# Patient Record
Sex: MALE | Race: BLACK OR AFRICAN AMERICAN | NOT HISPANIC OR LATINO | Employment: OTHER | URBAN - NONMETROPOLITAN AREA
[De-identification: names, ages, dates, MRNs, and addresses within clinical notes are randomized per-mention and may not be internally consistent; named-entity substitution may affect disease eponyms.]

---

## 2018-07-08 ENCOUNTER — HOSPITAL ENCOUNTER (EMERGENCY)
Facility: HOSPITAL | Age: 23
Discharge: HOME | End: 2018-07-09
Attending: EMERGENCY MEDICINE | Admitting: EMERGENCY MEDICINE

## 2018-07-08 ENCOUNTER — APPOINTMENT (OUTPATIENT)
Dept: GENERAL RADIOLOGY | Facility: HOSPITAL | Age: 23
End: 2018-07-08

## 2018-07-08 DIAGNOSIS — R07.9 CHEST PAIN, UNSPECIFIED TYPE: Primary | ICD-10-CM

## 2018-07-08 LAB
ANION GAP SERPL CALCULATED.3IONS-SCNC: 14 MMOL/L (ref 4–13)
APTT PPP: 36 SECONDS (ref 24.1–34.8)
BASOPHILS # BLD AUTO: 0.02 10*3/MM3 (ref 0–0.2)
BASOPHILS NFR BLD AUTO: 0.2 % (ref 0–2)
BUN BLD-MCNC: 10 MG/DL (ref 5–21)
BUN/CREAT SERPL: 14.9 (ref 7–25)
CALCIUM SPEC-SCNC: 9.4 MG/DL (ref 8.4–10.4)
CHLORIDE SERPL-SCNC: 101 MMOL/L (ref 98–110)
CK MB SERPL-CCNC: 0.93 NG/ML (ref 0–5)
CO2 SERPL-SCNC: 27 MMOL/L (ref 24–31)
CREAT BLD-MCNC: 0.67 MG/DL (ref 0.5–1.4)
DEPRECATED RDW RBC AUTO: 42.3 FL (ref 40–54)
EOSINOPHIL # BLD AUTO: 0 10*3/MM3 (ref 0–0.7)
EOSINOPHIL NFR BLD AUTO: 0 % (ref 0–4)
ERYTHROCYTE [DISTWIDTH] IN BLOOD BY AUTOMATED COUNT: 14.2 % (ref 12–15)
GFR SERPL CREATININE-BSD FRML MDRD: >150 ML/MIN/1.73
GLUCOSE BLD-MCNC: 146 MG/DL (ref 70–100)
HCT VFR BLD AUTO: 40.9 % (ref 40–52)
HGB BLD-MCNC: 13.4 G/DL (ref 14–18)
INR PPP: 1 (ref 0.91–1.09)
LYMPHOCYTES # BLD AUTO: 2.39 10*3/MM3 (ref 0.72–4.86)
LYMPHOCYTES NFR BLD AUTO: 24.2 % (ref 15–45)
MCH RBC QN AUTO: 27 PG (ref 28–32)
MCHC RBC AUTO-ENTMCNC: 32.8 G/DL (ref 33–36)
MCV RBC AUTO: 82.5 FL (ref 82–95)
MONOCYTES # BLD AUTO: 0.46 10*3/MM3 (ref 0.19–1.3)
MONOCYTES NFR BLD AUTO: 4.7 % (ref 4–12)
NEUTROPHILS # BLD AUTO: 6.97 10*3/MM3 (ref 1.87–8.4)
NEUTROPHILS NFR BLD AUTO: 70.4 % (ref 39–78)
NT-PROBNP SERPL-MCNC: 87 PG/ML (ref 0–450)
PLATELET # BLD AUTO: 186 10*3/MM3 (ref 130–400)
PMV BLD AUTO: 12.7 FL (ref 6–12)
POTASSIUM BLD-SCNC: 3.6 MMOL/L (ref 3.5–5.3)
PROTHROMBIN TIME: 13.5 SECONDS (ref 11.9–14.6)
RBC # BLD AUTO: 4.96 10*6/MM3 (ref 4.8–5.9)
SODIUM BLD-SCNC: 142 MMOL/L (ref 135–145)
TROPONIN I SERPL-MCNC: <0.012 NG/ML (ref 0–0.03)
WBC NRBC COR # BLD: 9.89 10*3/MM3 (ref 4.8–10.8)

## 2018-07-08 PROCEDURE — 25010000002 MORPHINE PER 10 MG: Performed by: EMERGENCY MEDICINE

## 2018-07-08 PROCEDURE — 82553 CREATINE MB FRACTION: CPT | Performed by: EMERGENCY MEDICINE

## 2018-07-08 PROCEDURE — 80307 DRUG TEST PRSMV CHEM ANLYZR: CPT | Performed by: EMERGENCY MEDICINE

## 2018-07-08 PROCEDURE — 99284 EMERGENCY DEPT VISIT MOD MDM: CPT

## 2018-07-08 PROCEDURE — 85025 COMPLETE CBC W/AUTO DIFF WBC: CPT | Performed by: EMERGENCY MEDICINE

## 2018-07-08 PROCEDURE — 96375 TX/PRO/DX INJ NEW DRUG ADDON: CPT

## 2018-07-08 PROCEDURE — 85610 PROTHROMBIN TIME: CPT | Performed by: EMERGENCY MEDICINE

## 2018-07-08 PROCEDURE — 80048 BASIC METABOLIC PNL TOTAL CA: CPT | Performed by: EMERGENCY MEDICINE

## 2018-07-08 PROCEDURE — 93010 ELECTROCARDIOGRAM REPORT: CPT | Performed by: INTERNAL MEDICINE

## 2018-07-08 PROCEDURE — 25010000002 ONDANSETRON PER 1 MG: Performed by: EMERGENCY MEDICINE

## 2018-07-08 PROCEDURE — 71046 X-RAY EXAM CHEST 2 VIEWS: CPT

## 2018-07-08 PROCEDURE — 93005 ELECTROCARDIOGRAM TRACING: CPT | Performed by: EMERGENCY MEDICINE

## 2018-07-08 PROCEDURE — 96374 THER/PROPH/DIAG INJ IV PUSH: CPT

## 2018-07-08 PROCEDURE — 84484 ASSAY OF TROPONIN QUANT: CPT | Performed by: EMERGENCY MEDICINE

## 2018-07-08 PROCEDURE — 85730 THROMBOPLASTIN TIME PARTIAL: CPT | Performed by: EMERGENCY MEDICINE

## 2018-07-08 PROCEDURE — 83880 ASSAY OF NATRIURETIC PEPTIDE: CPT | Performed by: EMERGENCY MEDICINE

## 2018-07-08 RX ORDER — MORPHINE SULFATE 10 MG/ML
8 INJECTION INTRAMUSCULAR; INTRAVENOUS; SUBCUTANEOUS ONCE
Status: COMPLETED | OUTPATIENT
Start: 2018-07-08 | End: 2018-07-08

## 2018-07-08 RX ORDER — ALBUTEROL SULFATE 90 UG/1
2 AEROSOL, METERED RESPIRATORY (INHALATION) EVERY 4 HOURS PRN
Status: ON HOLD | COMMUNITY
End: 2018-09-21

## 2018-07-08 RX ORDER — ALBUTEROL SULFATE 1.25 MG/3ML
1 SOLUTION RESPIRATORY (INHALATION) EVERY 4 HOURS PRN
COMMUNITY
End: 2018-09-21 | Stop reason: HOSPADM

## 2018-07-08 RX ORDER — ONDANSETRON 2 MG/ML
4 INJECTION INTRAMUSCULAR; INTRAVENOUS ONCE
Status: COMPLETED | OUTPATIENT
Start: 2018-07-08 | End: 2018-07-08

## 2018-07-08 RX ADMIN — MORPHINE SULFATE 8 MG: 10 INJECTION INTRAVENOUS at 21:44

## 2018-07-08 RX ADMIN — ONDANSETRON 4 MG: 2 INJECTION INTRAMUSCULAR; INTRAVENOUS at 21:44

## 2018-07-09 VITALS
SYSTOLIC BLOOD PRESSURE: 157 MMHG | TEMPERATURE: 98.7 F | HEART RATE: 74 BPM | WEIGHT: 315 LBS | BODY MASS INDEX: 46.65 KG/M2 | DIASTOLIC BLOOD PRESSURE: 74 MMHG | RESPIRATION RATE: 17 BRPM | HEIGHT: 69 IN | OXYGEN SATURATION: 98 %

## 2018-07-09 LAB
AMPHET+METHAMPHET UR QL: NEGATIVE
BARBITURATES UR QL SCN: NEGATIVE
BENZODIAZ UR QL SCN: NEGATIVE
CANNABINOIDS SERPL QL: NEGATIVE
COCAINE UR QL: NEGATIVE
METHADONE UR QL SCN: NEGATIVE
OPIATES UR QL: POSITIVE
PCP UR QL SCN: NEGATIVE
TROPONIN I SERPL-MCNC: <0.012 NG/ML (ref 0–0.03)

## 2018-07-09 PROCEDURE — 93005 ELECTROCARDIOGRAM TRACING: CPT | Performed by: EMERGENCY MEDICINE

## 2018-07-09 PROCEDURE — 93010 ELECTROCARDIOGRAM REPORT: CPT | Performed by: INTERNAL MEDICINE

## 2018-07-09 PROCEDURE — 84484 ASSAY OF TROPONIN QUANT: CPT | Performed by: EMERGENCY MEDICINE

## 2018-07-09 RX ORDER — KETOROLAC TROMETHAMINE 10 MG/1
10 TABLET, FILM COATED ORAL EVERY 6 HOURS PRN
Qty: 15 TABLET | Refills: 0 | Status: ON HOLD | OUTPATIENT
Start: 2018-07-09 | End: 2018-09-21

## 2018-07-09 NOTE — ED PROVIDER NOTES
Subjective   24 y/o male with history of DM, HTN, HLD with history of pneumothorax in the past with RAD as well arrives for evaluation of 3 days of left sided sharp/stabbing pain without radiation but made worse with deep breathing that feels similar to his prior pneumothorax for which he denies, falls, trauma, fevers, chills, nausea, vomiting, diarrhea, palpitations, numbness, tingling, loss of sensation, hemoptysis, history of PE or DVT, recent surgeries or cancer treatments, LE pain or swelling, abdominal pain, coughing or any other issues. He arrives in NAD.         Family, social and past history reviewed as below, prior documentation of H and Ps and other documentation are reviewed:    Past Medical History:  No date: Asthma  No date: Atelectasis  No date: Cardiomegaly  No date: Depression  No date: Diabetes mellitus (CMS/[facility])  No date: Hyperlipidemia  No date: Hypertension  No date: Neuropathy  No date: PTSD (post-traumatic stress disorder)  No date: Stroke (CMS/[facility])      Comment: tia    Past Surgical History:  No date: HERNIA REPAIR    Social History    Marital status:                     Spouse name:                       Years of education:                 Number of children:               Occupational History    None on file    Social History Main Topics    Smoking status: Current Some Day Smoker                                                      Packs/day: 0.00      Years: 0.00           Smokeless tobacco: Not on file                       Alcohol use: Yes                Comment: on occassion    Drug use: Yes                Types: Marijuana       Comment: on occassion    Sexual activity: Not on file          Other Topics            Concern    None on file    Social History Narrative    None on file        Family history: reviewed and father had MI at 21            Review of Systems   All other systems reviewed and are negative.      Past Medical History:   Diagnosis Date   • Asthma    • Atelectasis    •  Cardiomegaly    • Depression    • Diabetes mellitus (CMS/HCC)    • Hyperlipidemia    • Hypertension    • Neuropathy    • PTSD (post-traumatic stress disorder)    • Stroke (CMS/HCC)     tia       Allergies   Allergen Reactions   • Penicillins Anaphylaxis   • Keflex [Cephalexin] Hives       Past Surgical History:   Procedure Laterality Date   • HERNIA REPAIR         Family History   Problem Relation Age of Onset   • Heart failure Father    • Heart failure Maternal Grandmother        Social History     Social History   • Marital status: Single     Social History Main Topics   • Smoking status: Current Some Day Smoker   • Alcohol use Yes      Comment: on occassion   • Drug use: Yes     Types: Marijuana      Comment: on occassion     Other Topics Concern   • Not on file           Objective   Physical Exam   Constitutional: He is oriented to person, place, and time. He appears well-developed and well-nourished.   HENT:   Head: Normocephalic.   Nose: Nose normal.   Eyes: Conjunctivae and EOM are normal. Pupils are equal, round, and reactive to light.   Neck: Normal range of motion.   Cardiovascular: Normal rate, regular rhythm, normal heart sounds and intact distal pulses.    Pulmonary/Chest: Effort normal and breath sounds normal. No respiratory distress. He has no wheezes. He has no rales. He exhibits tenderness.   Abdominal: Soft. Bowel sounds are normal. He exhibits no distension. There is no tenderness.   Musculoskeletal: Normal range of motion. He exhibits no edema.   Neurological: He is alert and oriented to person, place, and time. No cranial nerve deficit. Coordination normal.   Skin: Skin is warm. Capillary refill takes less than 2 seconds. No rash noted. No erythema.   Psychiatric: He has a normal mood and affect.   Vitals reviewed.      ECG 12 Lead    Date/Time: 7/8/2018 8:54 PM  Performed by: ELAYNE ROB  Authorized by: ELAYNE ROB   Interpreted by physician  Previous ECG: no previous ECG  available  Rhythm: sinus rhythm  Rate: normal  BPM: 96  QRS axis: normal  Comments:     Non specific st t changes                 ED Course      XR Chest 2 View   Final Result   1. There is no acute cardiopulmonary process.   2. There is no obvious pneumothorax           This report was finalized on 07/08/2018 22:02 by Dr. Eric Helm MD.        Labs Reviewed   BASIC METABOLIC PANEL - Abnormal; Notable for the following:        Result Value    Glucose 146 (*)     Anion Gap 14.0 (*)     All other components within normal limits    Narrative:     GFR Normal >60  Chronic Kidney Disease <60  Kidney Failure <15   APTT - Abnormal; Notable for the following:     PTT 36.0 (*)     All other components within normal limits   URINE DRUG SCREEN - Abnormal; Notable for the following:     Opiate Screen Positive (*)     All other components within normal limits    Narrative:     Negative Thresholds For Drugs Screened in Urine:    Amphetamines          500 ng/ml  Barbiturates          200 ng/ml  Benzodiazepines       200 ng/ml  Cocaine               150 ng/ml  Methadone             150 ng/ml  Opiates               300 ng/ml  Phencyclidine         25 ng/ml  THC                      50 ng/ml    The normal value for all drugs tested is negative. This report includes final unconfirmed screening results.  A positive result by this assay can be, at your request, sent to the Reference Lab for confirmation by gas chromatography. Unconfirmed results must not be used for non-medical purposes, such as employment or legal testing. Clinical consideration should be applied to any drug of abuse test result, particularly when unconfirmed results are used.   CBC WITH AUTO DIFFERENTIAL - Abnormal; Notable for the following:     Hemoglobin 13.4 (*)     MCH 27.0 (*)     MCHC 32.8 (*)     MPV 12.7 (*)     All other components within normal limits   PROTIME-INR - Normal   BNP (IN-HOUSE) - Normal   CK MB - Normal    Narrative:     CKMB Index  not indicated   TROPONIN (IN-HOUSE) - Normal   TROPONIN (IN-HOUSE) - Normal   CBC AND DIFFERENTIAL    Narrative:     The following orders were created for panel order CBC & Differential.  Procedure                               Abnormality         Status                     ---------                               -----------         ------                     Manual Differential[[id number]]                                                         CBC Auto Differential[[id number]]        Abnormal            Final result                 Please view results for these tests on the individual orders.     Cp free, two sets of CE and ekg unrevealing, perc score is 0 no need for dimer or further work up for PE. At this time given family history will schedule for stress and encourage follow up and return precautions.           HEART Score (for prediction of 6-week risk of major adverse cardiac event) reviewed and/or performed as part of the patient evaluation and treatment planning process.  The result associated with this review/performance is: 2       MDM      Final diagnoses:   Chest pain, unspecified type            Jose Han MD  07/09/18 0101

## 2018-07-09 NOTE — DISCHARGE INSTRUCTIONS
"      Pio,     While your labs and EKG today did not reveal any signs of heart attack I am scheduling you for a stress test. They should call you in the next 24-48 hours. Please return for worsening pain, fevers, chills, changes in location of pain or any other issues.     Hypertension, commonly called high blood pressure, is when the force of blood pumping through the arteries is too strong. The arteries are the blood vessels that carry blood from the heart throughout the body. Hypertension forces the heart to work harder to pump blood and may cause arteries to become narrow or stiff. Having untreated or uncontrolled hypertension can cause heart attacks, strokes, kidney disease, and other problems.  A blood pressure reading consists of a higher number over a lower number. Ideally, your blood pressure should be below 120/80. The first (\"top\") number is called the systolic pressure. It is a measure of the pressure in your arteries as your heart beats. The second (\"bottom\") number is called the diastolic pressure. It is a measure of the pressure in your arteries as the heart relaxes.  What are the causes?  The cause of this condition is not known.  What increases the risk?  Some risk factors for high blood pressure are under your control. Others are not.  Factors you can change  · Smoking.  · Having type 2 diabetes mellitus, high cholesterol, or both.  · Not getting enough exercise or physical activity.  · Being overweight.  · Having too much fat, sugar, calories, or salt (sodium) in your diet.  · Drinking too much alcohol.  Factors that are difficult or impossible to change  · Having chronic kidney disease.  · Having a family history of high blood pressure.  · Age. Risk increases with age.  · Race. You may be at higher risk if you are -American.  · Gender. Men are at higher risk than women before age 45. After age 65, women are at higher risk than men.  · Having obstructive sleep apnea.  · Stress.  What are " the signs or symptoms?  Extremely high blood pressure (hypertensive crisis) may cause:  · Headache.  · Anxiety.  · Shortness of breath.  · Nosebleed.  · Nausea and vomiting.  · Severe chest pain.  · Jerky movements you cannot control (seizures).    How is this diagnosed?  This condition is diagnosed by measuring your blood pressure while you are seated, with your arm resting on a surface. The cuff of the blood pressure monitor will be placed directly against the skin of your upper arm at the level of your heart. It should be measured at least twice using the same arm. Certain conditions can cause a difference in blood pressure between your right and left arms.  Certain factors can cause blood pressure readings to be lower or higher than normal (elevated) for a short period of time:  · When your blood pressure is higher when you are in a health care provider's office than when you are at home, this is called white coat hypertension. Most people with this condition do not need medicines.  · When your blood pressure is higher at home than when you are in a health care provider's office, this is called masked hypertension. Most people with this condition may need medicines to control blood pressure.    If you have a high blood pressure reading during one visit or you have normal blood pressure with other risk factors:  · You may be asked to return on a different day to have your blood pressure checked again.  · You may be asked to monitor your blood pressure at home for 1 week or longer.    If you are diagnosed with hypertension, you may have other blood or imaging tests to help your health care provider understand your overall risk for other conditions.  How is this treated?  This condition is treated by making healthy lifestyle changes, such as eating healthy foods, exercising more, and reducing your alcohol intake. Your health care provider may prescribe medicine if lifestyle changes are not enough to get your blood  pressure under control, and if:  · Your systolic blood pressure is above 130.  · Your diastolic blood pressure is above 80.    Your personal target blood pressure may vary depending on your medical conditions, your age, and other factors.  Follow these instructions at home:  Eating and drinking  · Eat a diet that is high in fiber and potassium, and low in sodium, added sugar, and fat. An example eating plan is called the DASH (Dietary Approaches to Stop Hypertension) diet. To eat this way:  ? Eat plenty of fresh fruits and vegetables. Try to fill half of your plate at each meal with fruits and vegetables.  ? Eat whole grains, such as whole wheat pasta, brown rice, or whole grain bread. Fill about one quarter of your plate with whole grains.  ? Eat or drink low-fat dairy products, such as skim milk or low-fat yogurt.  ? Avoid fatty cuts of meat, processed or cured meats, and poultry with skin. Fill about one quarter of your plate with lean proteins, such as fish, chicken without skin, beans, eggs, and tofu.  ? Avoid premade and processed foods. These tend to be higher in sodium, added sugar, and fat.  · Reduce your daily sodium intake. Most people with hypertension should eat less than 1,500 mg of sodium a day.  · Limit alcohol intake to no more than 1 drink a day for nonpregnant women and 2 drinks a day for men. One drink equals 12 oz of beer, 5 oz of wine, or 1½ oz of hard liquor.  Lifestyle  · Work with your health care provider to maintain a healthy body weight or to lose weight. Ask what an ideal weight is for you.  · Get at least 30 minutes of exercise that causes your heart to beat faster (aerobic exercise) most days of the week. Activities may include walking, swimming, or biking.  · Include exercise to strengthen your muscles (resistance exercise), such as pilates or lifting weights, as part of your weekly exercise routine. Try to do these types of exercises for 30 minutes at least 3 days a week.  · Do not  use any products that contain nicotine or tobacco, such as cigarettes and e-cigarettes. If you need help quitting, ask your health care provider.  · Monitor your blood pressure at home as told by your health care provider.  · Keep all follow-up visits as told by your health care provider. This is important.  Medicines  · Take over-the-counter and prescription medicines only as told by your health care provider. Follow directions carefully. Blood pressure medicines must be taken as prescribed.  · Do not skip doses of blood pressure medicine. Doing this puts you at risk for problems and can make the medicine less effective.  · Ask your health care provider about side effects or reactions to medicines that you should watch for.  Contact a health care provider if:  · You think you are having a reaction to a medicine you are taking.  · You have headaches that keep coming back (recurring).  · You feel dizzy.  · You have swelling in your ankles.  · You have trouble with your vision.  Get help right away if:  · You develop a severe headache or confusion.  · You have unusual weakness or numbness.  · You feel faint.  · You have severe pain in your chest or abdomen.  · You vomit repeatedly.  · You have trouble breathing.  Summary  · Hypertension is when the force of blood pumping through your arteries is too strong. If this condition is not controlled, it may put you at risk for serious complications.  · Your personal target blood pressure may vary depending on your medical conditions, your age, and other factors. For most people, a normal blood pressure is less than 120/80.  · Hypertension is treated with lifestyle changes, medicines, or a combination of both. Lifestyle changes include weight loss, eating a healthy, low-sodium diet, exercising more, and limiting alcohol.  This information is not intended to replace advice given to you by your health care provider. Make sure you discuss any questions you have with your health  care provider.  Document Released: 12/18/2006 Document Revised: 11/15/2017 Document Reviewed: 11/15/2017  Elsevier Interactive Patient Education © 2018 [employer]

## 2018-07-10 ENCOUNTER — HOSPITAL ENCOUNTER (EMERGENCY)
Facility: HOSPITAL | Age: 23
Discharge: HOME | End: 2018-07-10
Admitting: EMERGENCY MEDICINE

## 2018-07-10 VITALS
DIASTOLIC BLOOD PRESSURE: 68 MMHG | RESPIRATION RATE: 16 BRPM | HEART RATE: 80 BPM | TEMPERATURE: 98.8 F | HEIGHT: 69 IN | BODY MASS INDEX: 46.65 KG/M2 | SYSTOLIC BLOOD PRESSURE: 131 MMHG | OXYGEN SATURATION: 99 % | WEIGHT: 315 LBS

## 2018-07-10 DIAGNOSIS — H10.9 CONJUNCTIVITIS OF BOTH EYES, UNSPECIFIED CONJUNCTIVITIS TYPE: Primary | ICD-10-CM

## 2018-07-10 PROCEDURE — 99283 EMERGENCY DEPT VISIT LOW MDM: CPT

## 2018-07-10 RX ORDER — ERYTHROMYCIN 5 MG/G
OINTMENT OPHTHALMIC
Qty: 3.5 G | Refills: 0 | Status: ON HOLD | OUTPATIENT
Start: 2018-07-10 | End: 2018-09-21

## 2018-07-10 RX ORDER — KETOTIFEN FUMARATE 0.35 MG/ML
1 SOLUTION/ DROPS OPHTHALMIC 2 TIMES DAILY
Qty: 5 ML | Refills: 0 | Status: ON HOLD | OUTPATIENT
Start: 2018-07-10 | End: 2018-09-21

## 2018-07-10 RX ADMIN — FLUORESCEIN SODIUM AND PROPARACAINE HYDROCHLORIDE 1 DROP: 2.5; 5 SOLUTION/ DROPS OPHTHALMIC at 22:20

## 2018-07-11 NOTE — ED NOTES
Left eye 20/80  Right eye 20/50    Pt states letters are blurry when looking at chart      Carter Little RN  07/10/18 [id number]

## 2018-07-11 NOTE — ED PROVIDER NOTES
Subjective     Eye Problem   Associated symptoms: photophobia and redness    Associated symptoms: no discharge and no itching      Patient is a 23-year-old male with chief complaint of bilateral eye irritation.  He describes this has been present the past week.  He complains of irritation in both of his eyes.  He assumed it was due to his allergies.  He has been rubbing them extensively.  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 He thinks his eye issues have worsened at work. While at work today, he felt as if something was in his left eye.  He continued to rub it.  Soon thereafter, he had similar sensation to his right eye. He tried washing it out. He asked his boss to look at it to see if has any foreign body in his eyes. He denies any foreign bodies that he could.  With the irritation continuing in both eyes, he came to ED further evaluated.    The patient is complaining of watery drainage.  He is feeling a burning sensation.  He denies any loss of vision.  He denies  wearing contacts.  He denies any exposure to UV lights or anything excessive beyond the LED lights at work.  He denies welding.  He denies any trauma.    Review of Systems   Constitutional: Negative.    HENT: Negative.    Eyes: Positive for photophobia and redness. Negative for pain, discharge, itching and visual disturbance.   Respiratory: Negative.    Cardiovascular: Negative.    Genitourinary: Negative.    Skin: Negative.    Neurological: Negative.    Psychiatric/Behavioral: Negative.    All other systems reviewed and are negative.      Past Medical History:   Diagnosis Date   • Asthma    • Atelectasis    • Cardiomegaly    • Depression    • Diabetes mellitus (CMS/[facility])    • Hyperlipidemia    • Hypertension    • Neuropathy    • PTSD (post-traumatic stress disorder)    • Stroke (CMS/[facility])     tia       Allergies   Allergen Reactions   • Penicillins Anaphylaxis   • Keflex [Cephalexin] Hives       Past Surgical  "History:   Procedure Laterality Date   • HERNIA REPAIR         Family History   Problem Relation Age of Onset   • Heart failure Father    • Heart failure Maternal Grandmother        Social History     Social History   • Marital status: Single     Social History Main Topics   • Smoking status: Current Some Day Smoker   • Alcohol use Yes      Comment: on occassion   • Drug use: Yes     Types: Marijuana      Comment: on occassion     Other Topics Concern   • Not on file       Prior to Admission medications    Medication Sig Start Date End Date Taking? Authorizing Provider   albuterol (ACCUNEB) 1.25 MG/3ML nebulizer solution Take 1 ampule by nebulization Every 6 (Six) Hours As Needed for Wheezing.    Historical Provider, MD   albuterol (PROVENTIL HFA;VENTOLIN HFA) 108 (90 Base) MCG/ACT inhaler Inhale 2 puffs Every 4 (Four) Hours As Needed for Wheezing.    Historical Provider, MD   insulin detemir (LEVEMIR) 100 UNIT/ML injection Inject 20 Units under the skin Daily.    Historical Provider, MD   ketorolac (TORADOL) 10 MG tablet Take 1 tablet by mouth Every 6 (Six) Hours As Needed for Moderate Pain . 7/9/18   Jose Han MD   Pregabalin (LYRICA PO) Take  by mouth.    Historical Provider, MD       Medications   Fluorescein-Proparacaine 0.25-0.5 % solution 1 drop (not administered)       /71   Pulse 80   Temp 98 °F (36.7 °C)   Resp 18   Ht 175.3 cm (69\")   Wt (!) 145 kg (320 lb)   SpO2 98%   BMI 47.26 kg/m²       Objective   Physical Exam   Constitutional: He is oriented to person, place, and time. He appears well-developed and well-nourished.  Non-toxic appearance. No distress.   HENT:   Head: Normocephalic and atraumatic.   Right Ear: External ear normal.   Left Ear: External ear normal.   Nose: Nose normal.   Mouth/Throat: Uvula is midline, oropharynx is clear and moist and mucous membranes are normal.   Eyes: Conjunctivae, EOM and lids are normal. Pupils are equal, round, and reactive to light. Lids " are everted and swept, no foreign bodies found. Right eye exhibits discharge and exudate. Right eye exhibits no chemosis and no hordeolum. Left eye exhibits discharge and exudate. Left eye exhibits no chemosis and no hordeolum. Right conjunctiva is not injected. Left conjunctiva is not injected. Right eye exhibits normal extraocular motion and no nystagmus. Left eye exhibits normal extraocular motion and no nystagmus.   Slit lamp exam:       The right eye shows no corneal abrasion and no fluorescein uptake.        The left eye shows no corneal abrasion and no fluorescein uptake.   Neck: Trachea normal, normal range of motion, full passive range of motion without pain and phonation normal. Neck supple. Normal carotid pulses and no JVD present. Carotid bruit is not present. No neck rigidity.   Cardiovascular: Normal rate, regular rhythm, normal heart sounds, intact distal pulses and normal pulses.    Pulmonary/Chest: Effort normal and breath sounds normal. No stridor. No apnea and no tachypnea. No respiratory distress.   Abdominal: Normal appearance and normal aorta.   Musculoskeletal: Normal range of motion.   Neurological: He is alert and oriented to person, place, and time. He has normal strength. No cranial nerve deficit or sensory deficit. Coordination and gait normal. GCS eye subscore is 4. GCS verbal subscore is 5. GCS motor subscore is 6.   Skin: Skin is warm, dry and intact. Capillary refill takes less than 2 seconds. He is not diaphoretic. No pallor.   Psychiatric: He has a normal mood and affect. His speech is normal and behavior is normal.   Nursing note and vitals reviewed.      Procedures         Lab Results (last 24 hours)     ** No results found for the last 24 hours. **          Xr Chest 2 View    Result Date: 7/8/2018  Narrative: XR CHEST 2 VW- 7/8/2018 9:28 PM CDT  HISTORY: cp, history of pneumothorax   COMPARISON: None.  FINDINGS: Upright frontal and lateral radiographs of the chest were obtained.   The lungs are clear. The cardiomediastinal silhouette and pulmonary vascularity are within normal limits. The osseous structures and surrounding soft tissues demonstrate no acute abnormality.      Impression: 1. There is no acute cardiopulmonary process. 2. There is no obvious pneumothorax   This report was finalized on 07/08/2018 22:02 by Dr. Eric Helm MD.      ED Course        DURING fluoroscein evaluaiton, the patient continuously rubs his eyelids vigorously. He has been repetitively told to stop but he continues.  Patient has been educated on clinical findings. He may be reacting to something at work. He has been educated to avoid predisposing factors.     MDM    Final diagnoses:   Conjunctivitis of both eyes, unspecified conjunctivitis type          IRON Lopez  07/10/18 [id number]

## 2018-07-11 NOTE — ED NOTES
Bilateral eyes mildly swollen and red.  Pt states they burn and itch     Carter Little RN  07/10/18 2055

## 2018-08-06 ENCOUNTER — HOSPITAL ENCOUNTER (EMERGENCY)
Facility: HOSPITAL | Age: 23
Discharge: HOME | End: 2018-08-07
Attending: EMERGENCY MEDICINE | Admitting: EMERGENCY MEDICINE

## 2018-08-06 ENCOUNTER — APPOINTMENT (OUTPATIENT)
Dept: GENERAL RADIOLOGY | Facility: HOSPITAL | Age: 23
End: 2018-08-06

## 2018-08-06 DIAGNOSIS — R07.89 CHEST WALL PAIN: Primary | ICD-10-CM

## 2018-08-06 LAB
ALBUMIN SERPL-MCNC: 4.1 G/DL (ref 3.5–5)
ALBUMIN/GLOB SERPL: 1.6 G/DL (ref 1.1–2.5)
ALP SERPL-CCNC: 50 U/L (ref 24–120)
ALT SERPL W P-5'-P-CCNC: 28 U/L (ref 0–54)
ANION GAP SERPL CALCULATED.3IONS-SCNC: 10 MMOL/L (ref 4–13)
APTT PPP: 33.5 SECONDS (ref 24.1–34.8)
AST SERPL-CCNC: 23 U/L (ref 7–45)
BASOPHILS # BLD AUTO: 0.02 10*3/MM3 (ref 0–0.2)
BASOPHILS NFR BLD AUTO: 0.2 % (ref 0–2)
BILIRUB SERPL-MCNC: 0.4 MG/DL (ref 0.1–1)
BUN BLD-MCNC: 8 MG/DL (ref 5–21)
BUN/CREAT SERPL: 11.1 (ref 7–25)
CALCIUM SPEC-SCNC: 8.7 MG/DL (ref 8.4–10.4)
CHLORIDE SERPL-SCNC: 100 MMOL/L (ref 98–110)
CO2 SERPL-SCNC: 28 MMOL/L (ref 24–31)
CREAT BLD-MCNC: 0.72 MG/DL (ref 0.5–1.4)
D DIMER PPP FEU-MCNC: 0.26 MG/L (FEU) (ref 0–0.5)
DEPRECATED RDW RBC AUTO: 40.2 FL (ref 40–54)
EOSINOPHIL # BLD AUTO: 0 10*3/MM3 (ref 0–0.7)
EOSINOPHIL NFR BLD AUTO: 0 % (ref 0–4)
ERYTHROCYTE [DISTWIDTH] IN BLOOD BY AUTOMATED COUNT: 13.5 % (ref 12–15)
GFR SERPL CREATININE-BSD FRML MDRD: >150 ML/MIN/1.73
GLOBULIN UR ELPH-MCNC: 2.6 GM/DL
GLUCOSE BLD-MCNC: 271 MG/DL (ref 70–100)
HCT VFR BLD AUTO: 40.2 % (ref 40–52)
HGB BLD-MCNC: 13.3 G/DL (ref 14–18)
HOLD SPECIMEN: NORMAL
HOLD SPECIMEN: NORMAL
IMM GRANULOCYTES # BLD: 0.03 10*3/MM3 (ref 0–0.03)
IMM GRANULOCYTES NFR BLD: 0.3 % (ref 0–5)
INR PPP: 1.01 (ref 0.91–1.09)
LYMPHOCYTES # BLD AUTO: 2.48 10*3/MM3 (ref 0.72–4.86)
LYMPHOCYTES NFR BLD AUTO: 27 % (ref 15–45)
MCH RBC QN AUTO: 27.1 PG (ref 28–32)
MCHC RBC AUTO-ENTMCNC: 33.1 G/DL (ref 33–36)
MCV RBC AUTO: 81.9 FL (ref 82–95)
MONOCYTES # BLD AUTO: 0.54 10*3/MM3 (ref 0.19–1.3)
MONOCYTES NFR BLD AUTO: 5.9 % (ref 4–12)
NEUTROPHILS # BLD AUTO: 6.1 10*3/MM3 (ref 1.87–8.4)
NEUTROPHILS NFR BLD AUTO: 66.6 % (ref 39–78)
NRBC BLD MANUAL-RTO: 0 /100 WBC (ref 0–0)
NT-PROBNP SERPL-MCNC: 31.5 PG/ML (ref 0–450)
PLATELET # BLD AUTO: 217 10*3/MM3 (ref 130–400)
PMV BLD AUTO: 12.3 FL (ref 6–12)
POTASSIUM BLD-SCNC: 4 MMOL/L (ref 3.5–5.3)
PROT SERPL-MCNC: 6.7 G/DL (ref 6.3–8.7)
PROTHROMBIN TIME: 13.6 SECONDS (ref 11.9–14.6)
RBC # BLD AUTO: 4.91 10*6/MM3 (ref 4.8–5.9)
SODIUM BLD-SCNC: 138 MMOL/L (ref 135–145)
TROPONIN I SERPL-MCNC: <0.012 NG/ML (ref 0–0.03)
WBC NRBC COR # BLD: 9.17 10*3/MM3 (ref 4.8–10.8)
WHOLE BLOOD HOLD SPECIMEN: NORMAL
WHOLE BLOOD HOLD SPECIMEN: NORMAL

## 2018-08-06 PROCEDURE — 25010000002 KETOROLAC TROMETHAMINE PER 15 MG: Performed by: EMERGENCY MEDICINE

## 2018-08-06 PROCEDURE — 84484 ASSAY OF TROPONIN QUANT: CPT | Performed by: EMERGENCY MEDICINE

## 2018-08-06 PROCEDURE — 80053 COMPREHEN METABOLIC PANEL: CPT | Performed by: EMERGENCY MEDICINE

## 2018-08-06 PROCEDURE — 94760 N-INVAS EAR/PLS OXIMETRY 1: CPT

## 2018-08-06 PROCEDURE — 94799 UNLISTED PULMONARY SVC/PX: CPT

## 2018-08-06 PROCEDURE — 85379 FIBRIN DEGRADATION QUANT: CPT | Performed by: EMERGENCY MEDICINE

## 2018-08-06 PROCEDURE — 99285 EMERGENCY DEPT VISIT HI MDM: CPT

## 2018-08-06 PROCEDURE — 71046 X-RAY EXAM CHEST 2 VIEWS: CPT

## 2018-08-06 PROCEDURE — 94640 AIRWAY INHALATION TREATMENT: CPT

## 2018-08-06 PROCEDURE — 93010 ELECTROCARDIOGRAM REPORT: CPT | Performed by: INTERNAL MEDICINE

## 2018-08-06 PROCEDURE — 96374 THER/PROPH/DIAG INJ IV PUSH: CPT

## 2018-08-06 PROCEDURE — 93005 ELECTROCARDIOGRAM TRACING: CPT | Performed by: EMERGENCY MEDICINE

## 2018-08-06 PROCEDURE — 85025 COMPLETE CBC W/AUTO DIFF WBC: CPT | Performed by: EMERGENCY MEDICINE

## 2018-08-06 PROCEDURE — 85730 THROMBOPLASTIN TIME PARTIAL: CPT | Performed by: EMERGENCY MEDICINE

## 2018-08-06 PROCEDURE — 85610 PROTHROMBIN TIME: CPT | Performed by: EMERGENCY MEDICINE

## 2018-08-06 PROCEDURE — 96375 TX/PRO/DX INJ NEW DRUG ADDON: CPT

## 2018-08-06 PROCEDURE — 83880 ASSAY OF NATRIURETIC PEPTIDE: CPT | Performed by: EMERGENCY MEDICINE

## 2018-08-06 RX ORDER — SODIUM CHLORIDE 0.9 % (FLUSH) 0.9 %
10 SYRINGE (ML) INJECTION AS NEEDED
Status: DISCONTINUED | OUTPATIENT
Start: 2018-08-06 | End: 2018-08-07 | Stop reason: HOSPADM

## 2018-08-06 RX ORDER — KETOROLAC TROMETHAMINE 30 MG/ML
30 INJECTION, SOLUTION INTRAMUSCULAR; INTRAVENOUS ONCE
Status: COMPLETED | OUTPATIENT
Start: 2018-08-06 | End: 2018-08-06

## 2018-08-06 RX ORDER — ORPHENADRINE CITRATE 30 MG/ML
60 INJECTION INTRAMUSCULAR; INTRAVENOUS EVERY 12 HOURS
Status: DISCONTINUED | OUTPATIENT
Start: 2018-08-06 | End: 2018-08-07 | Stop reason: HOSPADM

## 2018-08-06 RX ORDER — IPRATROPIUM BROMIDE AND ALBUTEROL SULFATE 2.5; .5 MG/3ML; MG/3ML
3 SOLUTION RESPIRATORY (INHALATION) ONCE
Status: COMPLETED | OUTPATIENT
Start: 2018-08-06 | End: 2018-08-06

## 2018-08-06 RX ADMIN — KETOROLAC TROMETHAMINE 30 MG: 30 INJECTION, SOLUTION INTRAMUSCULAR; INTRAVENOUS at 22:45

## 2018-08-06 RX ADMIN — IPRATROPIUM BROMIDE AND ALBUTEROL SULFATE 3 ML: 2.5; .5 SOLUTION RESPIRATORY (INHALATION) at 21:56

## 2018-08-07 VITALS
WEIGHT: 315 LBS | DIASTOLIC BLOOD PRESSURE: 90 MMHG | TEMPERATURE: 98.3 F | SYSTOLIC BLOOD PRESSURE: 132 MMHG | HEART RATE: 85 BPM | HEIGHT: 69 IN | BODY MASS INDEX: 46.65 KG/M2 | OXYGEN SATURATION: 98 % | RESPIRATION RATE: 18 BRPM

## 2018-08-07 LAB — TROPONIN I SERPL-MCNC: <0.012 NG/ML (ref 0–0.03)

## 2018-08-07 PROCEDURE — 96375 TX/PRO/DX INJ NEW DRUG ADDON: CPT

## 2018-08-07 PROCEDURE — 93005 ELECTROCARDIOGRAM TRACING: CPT | Performed by: EMERGENCY MEDICINE

## 2018-08-07 PROCEDURE — 25010000002 ORPHENADRINE CITRATE PER 60 MG: Performed by: EMERGENCY MEDICINE

## 2018-08-07 PROCEDURE — 84484 ASSAY OF TROPONIN QUANT: CPT | Performed by: EMERGENCY MEDICINE

## 2018-08-07 PROCEDURE — 93010 ELECTROCARDIOGRAM REPORT: CPT | Performed by: INTERNAL MEDICINE

## 2018-08-07 RX ORDER — CYCLOBENZAPRINE HCL 10 MG
10 TABLET ORAL 3 TIMES DAILY PRN
Qty: 15 TABLET | Refills: 0 | Status: ON HOLD | OUTPATIENT
Start: 2018-08-07 | End: 2018-09-21

## 2018-08-07 RX ADMIN — ORPHENADRINE CITRATE 60 MG: 30 INJECTION INTRAMUSCULAR; INTRAVENOUS at 00:20

## 2018-08-07 NOTE — ED PROVIDER NOTES
Subjective   22 y/o male known to me from prior visit arrives for evaluation of left retrosternal cp with radiation to his left chest that started suddenly when he was sleeping without provoking or alleviating factors but prior similar history (seen by myself) who was to get a stress but never followed up. He notes family history of ACS but denies personal history. He denies fevers, chills, falls, trauma, cough, hemoptysis, history of PE or DVT, LE pain or swelling, trips, travels, recent surgeries or cancer treatments, numbness, tingling, loss of sensation or other issues. He arrives in [facility]      Family, social and past history reviewed as below, prior documentation of H and Ps and other documentation are reviewed:    Past Medical History:  No date: Asthma  No date: Atelectasis  No date: Cardiomegaly  No date: Depression  No date: Diabetes mellitus (CMS/[facility])  No date: Hyperlipidemia  No date: Hypertension  No date: Neuropathy  No date: PTSD (post-traumatic stress disorder)  No date: Stroke (CMS/[facility])      Comment:  tia    Past Surgical History:  No date: HERNIA REPAIR    Social History    Marital status: Single              Spouse name:                       Years of education:                 Number of children:               Occupational History    None on file    Social History Main Topics    Smoking status: Current Some Day Smoker                                                      Packs/day: 0.00      Years: 0.00           Smokeless tobacco: Not on file                       Alcohol use: Yes                Comment: on occassion    Drug use: Yes                Types: Marijuana       Comment: on occassion    Sexual activity: Not on file          Other Topics            Concern    None on file    Social History Narrative    None on file        Family history: reviewed and noncontributory             Review of Systems   All other systems reviewed and are negative.      Past Medical History:   Diagnosis Date   •  Asthma    • Atelectasis    • Cardiomegaly    • Depression    • Diabetes mellitus (CMS/HCC)    • Hyperlipidemia    • Hypertension    • Neuropathy    • PTSD (post-traumatic stress disorder)    • Stroke (CMS/HCC)     tia       Allergies   Allergen Reactions   • Penicillins Anaphylaxis   • Keflex [Cephalexin] Hives       Past Surgical History:   Procedure Laterality Date   • HERNIA REPAIR         Family History   Problem Relation Age of Onset   • Heart failure Father    • Heart failure Maternal Grandmother        Social History     Social History   • Marital status: Single     Social History Main Topics   • Smoking status: Current Some Day Smoker   • Alcohol use Yes      Comment: on occassion   • Drug use: Yes     Types: Marijuana      Comment: on occassion     Other Topics Concern   • Not on file           Objective   Physical Exam   Constitutional: He is oriented to person, place, and time. He appears well-developed and well-nourished.   HENT:   Head: Normocephalic.   Nose: Nose normal.   Eyes: Pupils are equal, round, and reactive to light. Conjunctivae and EOM are normal.   Neck: Normal range of motion. Neck supple.   Cardiovascular: Normal rate, regular rhythm, normal heart sounds and intact distal pulses.    Pulmonary/Chest: Effort normal. No respiratory distress. He has no wheezes. He has no rales. He exhibits tenderness.   reproducible upon movement of left arm and palpation   Abdominal: Soft. Bowel sounds are normal.   Musculoskeletal: Normal range of motion.   Neurological: He is alert and oriented to person, place, and time. He displays normal reflexes. No cranial nerve deficit. Coordination normal.   Skin: Skin is warm. Capillary refill takes less than 2 seconds.   Psychiatric: He has a normal mood and affect. His behavior is normal.   Vitals reviewed.      ECG 12 Lead    Date/Time: 8/6/2018 9:36 PM  Performed by: ELAYNE ROB  Authorized by: ELAYNE ROB   Rhythm: sinus tachycardia  Rate:  tachycardic  BPM: 101  QRS axis: normal        ECG 12 Lead    Date/Time: 8/7/2018 12:42 AM  Performed by: ELAYNE ROB  Authorized by: ELAYNE ROB   Interpreted by physician  Rhythm: sinus rhythm  Rate: normal  BPM: 96  QRS axis: normal                   ED Course      XR Chest 2 View   ED Interpretation   No active diseaes        Labs Reviewed   COMPREHENSIVE METABOLIC PANEL - Abnormal; Notable for the following:        Result Value    Glucose 271 (*)     All other components within normal limits   CBC WITH AUTO DIFFERENTIAL - Abnormal; Notable for the following:     Hemoglobin 13.3 (*)     MCV 81.9 (*)     MCH 27.1 (*)     MPV 12.3 (*)     All other components within normal limits   TROPONIN (IN-HOUSE) - Normal   TROPONIN (IN-HOUSE) - Normal   BNP (IN-HOUSE) - Normal   D-DIMER, QUANTITATIVE - Normal    Narrative:     Reference Range is 0-0.50 mg/L FEU. However, results <0.50 mg/L FEU tends to rule out DVT or PE. Results >0.50 mg/L FEU are not useful in predicting absence or presence of DVT or PE.   APTT - Normal   PROTIME-INR - Normal   RAINBOW DRAW    Narrative:     The following orders were created for panel order Morse Draw.  Procedure                               Abnormality         Status                     ---------                               -----------         ------                     Light Blue Top[[id number]]                                   Final result               Green Top (Gel)[[id number]]                                  Final result               Lavender Top[[id number]]                                     Final result               Red Top[[id number]]                                          Final result                 Please view results for these tests on the individual orders.   CBC AND DIFFERENTIAL    Narrative:     The following orders were created for panel order CBC & Differential.  Procedure                               Abnormality         Status                      ---------                               -----------         ------                     CBC Auto Differential[[id number]]        Abnormal            Final result                 Please view results for these tests on the individual orders.   LIGHT BLUE TOP   GREEN TOP   LAVENDER TOP   RED TOP     Patient states he will follow up for his stress this time. His pain is reproducible. Will dc to home.           HEART Score (for prediction of 6-week risk of major adverse cardiac event) reviewed and/or performed as part of the patient evaluation and treatment planning process.  The result associated with this review/performance is: 1       MDM      Final diagnoses:   Chest wall pain            Jose Han MD  08/07/18 0155

## 2018-08-07 NOTE — DISCHARGE INSTRUCTIONS
Chest Wall Pain  Chest wall pain is pain in or around the bones and muscles of your chest. Sometimes, an injury causes this pain. Sometimes, the cause may not be known. This pain may take several weeks or longer to get better.  Follow these instructions at home:  Pay attention to any changes in your symptoms. Take these actions to help with your pain:  · Rest as told by your health care provider.  · Avoid activities that cause pain. These include any activities that use your chest muscles or your abdominal and side muscles to lift heavy items.  · If directed, apply ice to the painful area:  ? Put ice in a plastic bag.  ? Place a towel between your skin and the bag.  ? Leave the ice on for 20 minutes, 2-3 times per day.  · Take over-the-counter and prescription medicines only as told by your health care provider.  · Do not use tobacco products, including cigarettes, chewing tobacco, and e-cigarettes. If you need help quitting, ask your health care provider.  · Keep all follow-up visits as told by your health care provider. This is important.    Contact a health care provider if:  · You have a fever.  · Your chest pain becomes worse.  · You have new symptoms.  Get help right away if:  · You have nausea or vomiting.  · You feel sweaty or light-headed.  · You have a cough with phlegm (sputum) or you cough up blood.  · You develop shortness of breath.  This information is not intended to replace advice given to you by your health care provider. Make sure you discuss any questions you have with your health care provider.  Document Released: 12/18/2006 Document Revised: 04/27/2017 Document Reviewed: 03/14/2016  [employer] Interactive Patient Education © 2018 [employer]

## 2018-08-10 ENCOUNTER — HOSPITAL ENCOUNTER (OUTPATIENT)
Dept: CARDIOLOGY | Facility: HOSPITAL | Age: 23
Discharge: HOME | End: 2018-08-10
Attending: EMERGENCY MEDICINE | Admitting: EMERGENCY MEDICINE

## 2018-08-10 VITALS
SYSTOLIC BLOOD PRESSURE: 130 MMHG | BODY MASS INDEX: 46.65 KG/M2 | WEIGHT: 315 LBS | DIASTOLIC BLOOD PRESSURE: 83 MMHG | HEIGHT: 69 IN

## 2018-08-10 DIAGNOSIS — R07.89 CHEST WALL PAIN: ICD-10-CM

## 2018-08-10 LAB
BH CV ECHO MEAS - AO MAX PG (FULL): 2.7 MMHG
BH CV ECHO MEAS - AO MAX PG: 6.4 MMHG
BH CV ECHO MEAS - AO MEAN PG (FULL): 2 MMHG
BH CV ECHO MEAS - AO MEAN PG: 4 MMHG
BH CV ECHO MEAS - AO ROOT AREA (BSA CORRECTED): 1.2
BH CV ECHO MEAS - AO ROOT AREA: 7.1 CM^2
BH CV ECHO MEAS - AO ROOT DIAM: 3 CM
BH CV ECHO MEAS - AO V2 MAX: 126 CM/SEC
BH CV ECHO MEAS - AO V2 MEAN: 101 CM/SEC
BH CV ECHO MEAS - AO V2 VTI: 28.7 CM
BH CV ECHO MEAS - AVA(I,A): 2.2 CM^2
BH CV ECHO MEAS - AVA(I,D): 2.2 CM^2
BH CV ECHO MEAS - AVA(V,A): 2.4 CM^2
BH CV ECHO MEAS - AVA(V,D): 2.4 CM^2
BH CV ECHO MEAS - BSA(HAYCOCK): 2.8 M^2
BH CV ECHO MEAS - BSA: 2.6 M^2
BH CV ECHO MEAS - BZI_BMI: 48.7 KILOGRAMS/M^2
BH CV ECHO MEAS - BZI_METRIC_HEIGHT: 175.3 CM
BH CV ECHO MEAS - BZI_METRIC_WEIGHT: 149.7 KG
BH CV ECHO MEAS - CONTRAST EF 4CH: 58 ML/M^2
BH CV ECHO MEAS - EDV(CUBED): 97.3 ML
BH CV ECHO MEAS - EDV(MOD-SP4): 61.5 ML
BH CV ECHO MEAS - EDV(TEICH): 97.3 ML
BH CV ECHO MEAS - EF(CUBED): 74.9 %
BH CV ECHO MEAS - EF(MOD-SP4): 58 %
BH CV ECHO MEAS - EF(TEICH): 66.9 %
BH CV ECHO MEAS - ESV(CUBED): 24.4 ML
BH CV ECHO MEAS - ESV(MOD-SP4): 25.8 ML
BH CV ECHO MEAS - ESV(TEICH): 32.2 ML
BH CV ECHO MEAS - FS: 37 %
BH CV ECHO MEAS - IVS/LVPW: 1.1
BH CV ECHO MEAS - IVSD: 1.3 CM
BH CV ECHO MEAS - LA DIMENSION: 3.6 CM
BH CV ECHO MEAS - LA/AO: 1.2
BH CV ECHO MEAS - LAT PEAK E' VEL: 11.1 CM/SEC
BH CV ECHO MEAS - LV DIASTOLIC VOL/BSA (35-75): 24.1 ML/M^2
BH CV ECHO MEAS - LV MASS(C)D: 217.4 GRAMS
BH CV ECHO MEAS - LV MASS(C)DI: 85.1 GRAMS/M^2
BH CV ECHO MEAS - LV MAX PG: 3.7 MMHG
BH CV ECHO MEAS - LV MEAN PG: 2 MMHG
BH CV ECHO MEAS - LV SYSTOLIC VOL/BSA (12-30): 10.1 ML/M^2
BH CV ECHO MEAS - LV V1 MAX: 96.1 CM/SEC
BH CV ECHO MEAS - LV V1 MEAN: 70.5 CM/SEC
BH CV ECHO MEAS - LV V1 VTI: 19.8 CM
BH CV ECHO MEAS - LVIDD: 4.6 CM
BH CV ECHO MEAS - LVIDS: 2.9 CM
BH CV ECHO MEAS - LVLD AP4: 8.1 CM
BH CV ECHO MEAS - LVLS AP4: 6.7 CM
BH CV ECHO MEAS - LVOT AREA (M): 3.1 CM^2
BH CV ECHO MEAS - LVOT AREA: 3.1 CM^2
BH CV ECHO MEAS - LVOT DIAM: 2 CM
BH CV ECHO MEAS - LVPWD: 1.2 CM
BH CV ECHO MEAS - MED PEAK E' VEL: 8.2 CM/SEC
BH CV ECHO MEAS - MV A MAX VEL: 48.5 CM/SEC
BH CV ECHO MEAS - MV DEC TIME: 0.18 SEC
BH CV ECHO MEAS - MV E MAX VEL: 69.8 CM/SEC
BH CV ECHO MEAS - MV E/A: 1.4
BH CV ECHO MEAS - SI(AO): 79.4 ML/M^2
BH CV ECHO MEAS - SI(CUBED): 28.5 ML/M^2
BH CV ECHO MEAS - SI(LVOT): 24.3 ML/M^2
BH CV ECHO MEAS - SI(MOD-SP4): 14 ML/M^2
BH CV ECHO MEAS - SI(TEICH): 25.5 ML/M^2
BH CV ECHO MEAS - SV(AO): 202.9 ML
BH CV ECHO MEAS - SV(CUBED): 72.9 ML
BH CV ECHO MEAS - SV(LVOT): 62.2 ML
BH CV ECHO MEAS - SV(MOD-SP4): 35.7 ML
BH CV ECHO MEAS - SV(TEICH): 65.1 ML
BH CV ECHO MEASUREMENTS AVERAGE E/E' RATIO: 7.23
LEFT ATRIUM VOLUME INDEX: 10 ML/M2
LEFT ATRIUM VOLUME: 25.6 CM3
MAXIMAL PREDICTED HEART RATE: 195 BPM
STRESS TARGET HR: 166 BPM

## 2018-08-10 PROCEDURE — 25010000002 PERFLUTREN 6.52 MG/ML SUSPENSION: Performed by: EMERGENCY MEDICINE

## 2018-08-10 PROCEDURE — 93306 TTE W/DOPPLER COMPLETE: CPT | Performed by: INTERNAL MEDICINE

## 2018-08-10 PROCEDURE — 93306 TTE W/DOPPLER COMPLETE: CPT

## 2018-08-10 RX ADMIN — PERFLUTREN 8.48 MG: 6.52 INJECTION, SUSPENSION INTRAVENOUS at 10:32

## 2018-09-20 ENCOUNTER — APPOINTMENT (OUTPATIENT)
Dept: GENERAL RADIOLOGY | Facility: HOSPITAL | Age: 23
End: 2018-09-20

## 2018-09-20 ENCOUNTER — HOSPITAL ENCOUNTER (INPATIENT)
Facility: HOSPITAL | Age: 23
LOS: 1 days | Discharge: HOME | End: 2018-09-21
Attending: EMERGENCY MEDICINE | Admitting: INTERNAL MEDICINE

## 2018-09-20 DIAGNOSIS — R07.9 CHEST PAIN, UNSPECIFIED TYPE: ICD-10-CM

## 2018-09-20 DIAGNOSIS — R73.9 HYPERGLYCEMIA: Primary | ICD-10-CM

## 2018-09-20 LAB
ACETONE BLD QL: NEGATIVE
ALBUMIN SERPL-MCNC: 4.6 G/DL (ref 3.5–5)
ALBUMIN/GLOB SERPL: 1.8 G/DL (ref 1.1–2.5)
ALP SERPL-CCNC: 97 U/L (ref 24–120)
ALT SERPL W P-5'-P-CCNC: 19 U/L (ref 0–54)
ANION GAP SERPL CALCULATED.3IONS-SCNC: 19 MMOL/L (ref 4–13)
ARTERIAL PATENCY WRIST A: POSITIVE
AST SERPL-CCNC: 32 U/L (ref 7–45)
ATMOSPHERIC PRESS: 750 MMHG
BASE EXCESS BLDA CALC-SCNC: -3.2 MMOL/L (ref 0–2)
BASOPHILS # BLD AUTO: 0.03 10*3/MM3 (ref 0–0.2)
BASOPHILS NFR BLD AUTO: 0.4 % (ref 0–2)
BDY SITE: ABNORMAL
BILIRUB SERPL-MCNC: 1.1 MG/DL (ref 0.1–1)
BILIRUB UR QL STRIP: NEGATIVE
BODY TEMPERATURE: 37 C
BUN BLD-MCNC: 7 MG/DL (ref 5–21)
BUN/CREAT SERPL: 7.3 (ref 7–25)
CALCIUM SPEC-SCNC: 9.4 MG/DL (ref 8.4–10.4)
CHLORIDE SERPL-SCNC: 93 MMOL/L (ref 98–110)
CLARITY UR: CLEAR
CO2 SERPL-SCNC: 19 MMOL/L (ref 24–31)
COLOR UR: YELLOW
CREAT BLD-MCNC: 0.96 MG/DL (ref 0.5–1.4)
D DIMER PPP FEU-MCNC: 0.22 MG/L (FEU) (ref 0–0.5)
DEPRECATED RDW RBC AUTO: 35 FL (ref 40–54)
EOSINOPHIL # BLD AUTO: 0 10*3/MM3 (ref 0–0.7)
EOSINOPHIL NFR BLD AUTO: 0 % (ref 0–4)
ERYTHROCYTE [DISTWIDTH] IN BLOOD BY AUTOMATED COUNT: 12.4 % (ref 12–15)
GAS FLOW AIRWAY: 2 LPM
GFR SERPL CREATININE-BSD FRML MDRD: 116 ML/MIN/1.73
GLOBULIN UR ELPH-MCNC: 2.6 GM/DL
GLUCOSE BLD-MCNC: 910 MG/DL (ref 70–100)
GLUCOSE BLDC GLUCOMTR-MCNC: 533 MG/DL (ref 70–130)
GLUCOSE UR STRIP-MCNC: ABNORMAL MG/DL
HCO3 BLDA-SCNC: 22 MMOL/L (ref 20–26)
HCT VFR BLD AUTO: 38.6 % (ref 40–52)
HGB BLD-MCNC: 13.7 G/DL (ref 14–18)
HGB UR QL STRIP.AUTO: NEGATIVE
HOLD SPECIMEN: NORMAL
HOLD SPECIMEN: NORMAL
KETONES UR QL STRIP: NEGATIVE
LEUKOCYTE ESTERASE UR QL STRIP.AUTO: NEGATIVE
LYMPHOCYTES # BLD AUTO: 2.34 10*3/MM3 (ref 0.72–4.86)
LYMPHOCYTES NFR BLD AUTO: 28.5 % (ref 15–45)
Lab: ABNORMAL
MCH RBC QN AUTO: 28 PG (ref 28–32)
MCHC RBC AUTO-ENTMCNC: 35.5 G/DL (ref 33–36)
MCV RBC AUTO: 78.9 FL (ref 82–95)
MODALITY: ABNORMAL
MONOCYTES # BLD AUTO: 0.42 10*3/MM3 (ref 0.19–1.3)
MONOCYTES NFR BLD AUTO: 5.1 % (ref 4–12)
NEUTROPHILS # BLD AUTO: 5.4 10*3/MM3 (ref 1.87–8.4)
NEUTROPHILS NFR BLD AUTO: 65.8 % (ref 39–78)
NITRITE UR QL STRIP: NEGATIVE
PCO2 BLDA: 39.2 MM HG (ref 35–45)
PH BLDA: 7.36 PH UNITS (ref 7.35–7.45)
PH UR STRIP.AUTO: 5.5 [PH] (ref 5–8)
PLATELET # BLD AUTO: 199 10*3/MM3 (ref 130–400)
PMV BLD AUTO: 13.9 FL (ref 6–12)
PO2 BLDA: 114 MM HG (ref 83–108)
POTASSIUM BLD-SCNC: 5.1 MMOL/L (ref 3.5–5.3)
PROT SERPL-MCNC: 7.2 G/DL (ref 6.3–8.7)
PROT UR QL STRIP: NEGATIVE
RBC # BLD AUTO: 4.89 10*6/MM3 (ref 4.8–5.9)
SAO2 % BLDCOA: 98.8 % (ref 94–99)
SODIUM BLD-SCNC: 131 MMOL/L (ref 135–145)
SP GR UR STRIP: >1.03 (ref 1–1.03)
TROPONIN I SERPL-MCNC: <0.012 NG/ML (ref 0–0.03)
UROBILINOGEN UR QL STRIP: ABNORMAL
VENTILATOR MODE: ABNORMAL
WBC NRBC COR # BLD: 8.21 10*3/MM3 (ref 4.8–10.8)
WHOLE BLOOD HOLD SPECIMEN: NORMAL
WHOLE BLOOD HOLD SPECIMEN: NORMAL

## 2018-09-20 PROCEDURE — 81003 URINALYSIS AUTO W/O SCOPE: CPT | Performed by: EMERGENCY MEDICINE

## 2018-09-20 PROCEDURE — 80307 DRUG TEST PRSMV CHEM ANLYZR: CPT | Performed by: INTERNAL MEDICINE

## 2018-09-20 PROCEDURE — 85379 FIBRIN DEGRADATION QUANT: CPT | Performed by: EMERGENCY MEDICINE

## 2018-09-20 PROCEDURE — 93005 ELECTROCARDIOGRAM TRACING: CPT | Performed by: EMERGENCY MEDICINE

## 2018-09-20 PROCEDURE — 82803 BLOOD GASES ANY COMBINATION: CPT

## 2018-09-20 PROCEDURE — 82962 GLUCOSE BLOOD TEST: CPT

## 2018-09-20 PROCEDURE — 71045 X-RAY EXAM CHEST 1 VIEW: CPT

## 2018-09-20 PROCEDURE — 80053 COMPREHEN METABOLIC PANEL: CPT | Performed by: EMERGENCY MEDICINE

## 2018-09-20 PROCEDURE — 82009 KETONE BODYS QUAL: CPT | Performed by: EMERGENCY MEDICINE

## 2018-09-20 PROCEDURE — 63710000001 INSULIN REGULAR HUMAN PER 5 UNITS: Performed by: EMERGENCY MEDICINE

## 2018-09-20 PROCEDURE — 99285 EMERGENCY DEPT VISIT HI MDM: CPT

## 2018-09-20 PROCEDURE — 25010000002 MORPHINE PER 10 MG: Performed by: EMERGENCY MEDICINE

## 2018-09-20 PROCEDURE — 83036 HEMOGLOBIN GLYCOSYLATED A1C: CPT | Performed by: EMERGENCY MEDICINE

## 2018-09-20 PROCEDURE — 84484 ASSAY OF TROPONIN QUANT: CPT | Performed by: EMERGENCY MEDICINE

## 2018-09-20 PROCEDURE — 93010 ELECTROCARDIOGRAM REPORT: CPT | Performed by: INTERNAL MEDICINE

## 2018-09-20 PROCEDURE — 85025 COMPLETE CBC W/AUTO DIFF WBC: CPT | Performed by: EMERGENCY MEDICINE

## 2018-09-20 PROCEDURE — 36600 WITHDRAWAL OF ARTERIAL BLOOD: CPT

## 2018-09-20 RX ORDER — NITROGLYCERIN 0.4 MG/1
0.4 TABLET SUBLINGUAL
Status: DISCONTINUED | OUTPATIENT
Start: 2018-09-20 | End: 2018-09-21 | Stop reason: HOSPADM

## 2018-09-20 RX ORDER — SODIUM CHLORIDE 0.9 % (FLUSH) 0.9 %
10 SYRINGE (ML) INJECTION AS NEEDED
Status: DISCONTINUED | OUTPATIENT
Start: 2018-09-20 | End: 2018-09-21 | Stop reason: HOSPADM

## 2018-09-20 RX ORDER — SODIUM CHLORIDE 9 MG/ML
100 INJECTION, SOLUTION INTRAVENOUS CONTINUOUS
Status: DISCONTINUED | OUTPATIENT
Start: 2018-09-20 | End: 2018-09-21 | Stop reason: HOSPADM

## 2018-09-20 RX ADMIN — SODIUM CHLORIDE 1000 ML: 9 INJECTION, SOLUTION INTRAVENOUS at 21:33

## 2018-09-20 RX ADMIN — MORPHINE SULFATE 4 MG: 4 INJECTION INTRAVENOUS at 20:57

## 2018-09-20 RX ADMIN — SODIUM CHLORIDE 1000 ML: 9 INJECTION, SOLUTION INTRAVENOUS at 21:30

## 2018-09-20 RX ADMIN — SODIUM CHLORIDE 250 ML/HR: 9 INJECTION, SOLUTION INTRAVENOUS at 23:00

## 2018-09-20 RX ADMIN — SODIUM CHLORIDE 0.1 UNITS/KG/HR: 9 INJECTION, SOLUTION INTRAVENOUS at 23:18

## 2018-09-20 RX ADMIN — NITROGLYCERIN 0.4 MG: 0.4 TABLET SUBLINGUAL at 21:02

## 2018-09-20 RX ADMIN — NITROGLYCERIN 0.4 MG: 0.4 TABLET SUBLINGUAL at 20:57

## 2018-09-20 RX ADMIN — INSULIN HUMAN 10 UNITS: 100 INJECTION, SOLUTION PARENTERAL at 21:37

## 2018-09-21 ENCOUNTER — APPOINTMENT (OUTPATIENT)
Dept: CARDIOLOGY | Facility: HOSPITAL | Age: 23
End: 2018-09-21
Attending: INTERNAL MEDICINE

## 2018-09-21 VITALS
SYSTOLIC BLOOD PRESSURE: 125 MMHG | RESPIRATION RATE: 14 BRPM | DIASTOLIC BLOOD PRESSURE: 78 MMHG | OXYGEN SATURATION: 95 % | HEART RATE: 69 BPM | BODY MASS INDEX: 45.1 KG/M2 | TEMPERATURE: 98.5 F | WEIGHT: 315 LBS | HEIGHT: 70 IN

## 2018-09-21 LAB
AMPHET+METHAMPHET UR QL: NEGATIVE
ANION GAP SERPL CALCULATED.3IONS-SCNC: 10 MMOL/L (ref 4–13)
ANION GAP SERPL CALCULATED.3IONS-SCNC: 10 MMOL/L (ref 4–13)
ARTICHOKE IGE QN: 106 MG/DL (ref 0–99)
BARBITURATES UR QL SCN: NEGATIVE
BENZODIAZ UR QL SCN: NEGATIVE
BH CV STRESS BP STAGE 1: NORMAL
BH CV STRESS BP STAGE 2: NORMAL
BH CV STRESS BP STAGE 3: NORMAL
BH CV STRESS DOB - ATROPINE STAGE 3: 1.3
BH CV STRESS DOSE DOBUTAMINE STAGE 1: 10
BH CV STRESS DOSE DOBUTAMINE STAGE 2: 20
BH CV STRESS DOSE DOBUTAMINE STAGE 3: 30
BH CV STRESS DURATION MIN STAGE 1: 3
BH CV STRESS DURATION MIN STAGE 2: 3
BH CV STRESS DURATION MIN STAGE 3: 2
BH CV STRESS DURATION SEC STAGE 1: 0
BH CV STRESS DURATION SEC STAGE 2: 0
BH CV STRESS DURATION SEC STAGE 3: 39
BH CV STRESS HR STAGE 1: 78
BH CV STRESS HR STAGE 2: 152
BH CV STRESS HR STAGE 3: 168
BH CV STRESS PROTOCOL 1: NORMAL
BH CV STRESS RECOVERY BP: NORMAL MMHG
BH CV STRESS RECOVERY HR: 100 BPM
BH CV STRESS STAGE 1: 1
BH CV STRESS STAGE 2: 2
BH CV STRESS STAGE 3: 3
BUN BLD-MCNC: 7 MG/DL (ref 5–21)
BUN BLD-MCNC: 8 MG/DL (ref 5–21)
BUN/CREAT SERPL: 10.3 (ref 7–25)
BUN/CREAT SERPL: 11.4 (ref 7–25)
CALCIUM SPEC-SCNC: 8.5 MG/DL (ref 8.4–10.4)
CALCIUM SPEC-SCNC: 8.8 MG/DL (ref 8.4–10.4)
CANNABINOIDS SERPL QL: NEGATIVE
CHLORIDE SERPL-SCNC: 105 MMOL/L (ref 98–110)
CHLORIDE SERPL-SCNC: 105 MMOL/L (ref 98–110)
CHOLEST SERPL-MCNC: 195 MG/DL (ref 130–200)
CO2 SERPL-SCNC: 25 MMOL/L (ref 24–31)
CO2 SERPL-SCNC: 26 MMOL/L (ref 24–31)
COCAINE UR QL: NEGATIVE
CREAT BLD-MCNC: 0.68 MG/DL (ref 0.5–1.4)
CREAT BLD-MCNC: 0.7 MG/DL (ref 0.5–1.4)
GFR SERPL CREATININE-BSD FRML MDRD: >150 ML/MIN/1.73
GFR SERPL CREATININE-BSD FRML MDRD: >150 ML/MIN/1.73
GLUCOSE BLD-MCNC: 180 MG/DL (ref 70–100)
GLUCOSE BLD-MCNC: 255 MG/DL (ref 70–100)
GLUCOSE BLD-MCNC: 421 MG/DL (ref 70–100)
GLUCOSE BLDC GLUCOMTR-MCNC: 182 MG/DL (ref 70–130)
GLUCOSE BLDC GLUCOMTR-MCNC: 186 MG/DL (ref 70–130)
GLUCOSE BLDC GLUCOMTR-MCNC: 220 MG/DL (ref 70–130)
GLUCOSE BLDC GLUCOMTR-MCNC: 234 MG/DL (ref 70–130)
GLUCOSE BLDC GLUCOMTR-MCNC: 247 MG/DL (ref 70–130)
GLUCOSE BLDC GLUCOMTR-MCNC: 312 MG/DL (ref 70–130)
GLUCOSE BLDC GLUCOMTR-MCNC: 333 MG/DL (ref 70–130)
GLUCOSE BLDC GLUCOMTR-MCNC: 372 MG/DL (ref 70–130)
GLUCOSE BLDC GLUCOMTR-MCNC: 493 MG/DL (ref 70–130)
GLUCOSE BLDC GLUCOMTR-MCNC: 519 MG/DL (ref 70–130)
HBA1C MFR BLD: 11.4 %
HDLC SERPL-MCNC: 27 MG/DL
LDLC/HDLC SERPL: ABNORMAL {RATIO}
LIPASE SERPL-CCNC: 42 U/L (ref 23–203)
MAGNESIUM SERPL-MCNC: 1.9 MG/DL (ref 1.4–2.2)
MAXIMAL PREDICTED HEART RATE: 195 BPM
METHADONE UR QL SCN: NEGATIVE
OPIATES UR QL: POSITIVE
OSMOLALITY SERPL: 297 MOSM/KG (ref 289–308)
PCP UR QL SCN: NEGATIVE
PERCENT MAX PREDICTED HR: 86.15 %
PHOSPHATE SERPL-MCNC: 4 MG/DL (ref 2.5–4.5)
POTASSIUM BLD-SCNC: 3.9 MMOL/L (ref 3.5–5.3)
POTASSIUM BLD-SCNC: 4 MMOL/L (ref 3.5–5.3)
SODIUM BLD-SCNC: 140 MMOL/L (ref 135–145)
SODIUM BLD-SCNC: 141 MMOL/L (ref 135–145)
STRESS BASELINE BP: NORMAL MMHG
STRESS BASELINE HR: 80 BPM
STRESS PERCENT HR: 101 %
STRESS POST EXERCISE DUR MIN: 8 MIN
STRESS POST EXERCISE DUR SEC: 39 SEC
STRESS POST PEAK BP: NORMAL MMHG
STRESS POST PEAK HR: 168 BPM
STRESS TARGET HR: 166 BPM
TRIGL SERPL-MCNC: 689 MG/DL (ref 0–149)
TROPONIN I SERPL-MCNC: <0.012 NG/ML (ref 0–0.03)

## 2018-09-21 PROCEDURE — 84484 ASSAY OF TROPONIN QUANT: CPT | Performed by: EMERGENCY MEDICINE

## 2018-09-21 PROCEDURE — 83930 ASSAY OF BLOOD OSMOLALITY: CPT | Performed by: INTERNAL MEDICINE

## 2018-09-21 PROCEDURE — 84484 ASSAY OF TROPONIN QUANT: CPT | Performed by: INTERNAL MEDICINE

## 2018-09-21 PROCEDURE — 93017 CV STRESS TEST TRACING ONLY: CPT

## 2018-09-21 PROCEDURE — 93352 ADMIN ECG CONTRAST AGENT: CPT | Performed by: INTERNAL MEDICINE

## 2018-09-21 PROCEDURE — 36415 COLL VENOUS BLD VENIPUNCTURE: CPT | Performed by: INTERNAL MEDICINE

## 2018-09-21 PROCEDURE — 25810000003 POTASSIUM CHLORIDE PER 2 MEQ: Performed by: INTERNAL MEDICINE

## 2018-09-21 PROCEDURE — 82962 GLUCOSE BLOOD TEST: CPT

## 2018-09-21 PROCEDURE — 63710000001 INSULIN DETEMIR PER 5 UNITS: Performed by: INTERNAL MEDICINE

## 2018-09-21 PROCEDURE — 84100 ASSAY OF PHOSPHORUS: CPT | Performed by: INTERNAL MEDICINE

## 2018-09-21 PROCEDURE — 83690 ASSAY OF LIPASE: CPT | Performed by: INTERNAL MEDICINE

## 2018-09-21 PROCEDURE — 93010 ELECTROCARDIOGRAM REPORT: CPT | Performed by: INTERNAL MEDICINE

## 2018-09-21 PROCEDURE — 93018 CV STRESS TEST I&R ONLY: CPT | Performed by: INTERNAL MEDICINE

## 2018-09-21 PROCEDURE — 93350 STRESS TTE ONLY: CPT | Performed by: INTERNAL MEDICINE

## 2018-09-21 PROCEDURE — 94799 UNLISTED PULMONARY SVC/PX: CPT

## 2018-09-21 PROCEDURE — 82947 ASSAY GLUCOSE BLOOD QUANT: CPT | Performed by: INTERNAL MEDICINE

## 2018-09-21 PROCEDURE — 93350 STRESS TTE ONLY: CPT

## 2018-09-21 PROCEDURE — 94760 N-INVAS EAR/PLS OXIMETRY 1: CPT

## 2018-09-21 PROCEDURE — 25010000003 DOBUTAMINE PER 250 MG: Performed by: INTERNAL MEDICINE

## 2018-09-21 PROCEDURE — 83735 ASSAY OF MAGNESIUM: CPT | Performed by: INTERNAL MEDICINE

## 2018-09-21 PROCEDURE — 25010000002 PERFLUTREN 6.52 MG/ML SUSPENSION: Performed by: INTERNAL MEDICINE

## 2018-09-21 PROCEDURE — 80061 LIPID PANEL: CPT | Performed by: INTERNAL MEDICINE

## 2018-09-21 PROCEDURE — 25010000002 ENOXAPARIN PER 10 MG: Performed by: INTERNAL MEDICINE

## 2018-09-21 PROCEDURE — 80048 BASIC METABOLIC PNL TOTAL CA: CPT | Performed by: INTERNAL MEDICINE

## 2018-09-21 PROCEDURE — 63710000001 INSULIN LISPRO (HUMAN) PER 5 UNITS: Performed by: INTERNAL MEDICINE

## 2018-09-21 RX ORDER — DEXTROSE MONOHYDRATE 25 G/50ML
12.5 INJECTION, SOLUTION INTRAVENOUS
Status: DISCONTINUED | OUTPATIENT
Start: 2018-09-21 | End: 2018-09-21 | Stop reason: HOSPADM

## 2018-09-21 RX ORDER — FENOFIBRATE 48 MG/1
48 TABLET, COATED ORAL DAILY
Qty: 30 TABLET | Refills: 0 | Status: SHIPPED | OUTPATIENT
Start: 2018-09-21 | End: 2018-09-21 | Stop reason: HOSPADM

## 2018-09-21 RX ORDER — SODIUM CHLORIDE 0.9 % (FLUSH) 0.9 %
1-10 SYRINGE (ML) INJECTION AS NEEDED
Status: DISCONTINUED | OUTPATIENT
Start: 2018-09-21 | End: 2018-09-21 | Stop reason: HOSPADM

## 2018-09-21 RX ORDER — DEXTROSE AND SODIUM CHLORIDE 5; .45 G/100ML; G/100ML
150 INJECTION, SOLUTION INTRAVENOUS CONTINUOUS PRN
Status: DISCONTINUED | OUTPATIENT
Start: 2018-09-21 | End: 2018-09-21 | Stop reason: HOSPADM

## 2018-09-21 RX ORDER — CLONAZEPAM 0.5 MG/1
0.5 TABLET ORAL 2 TIMES DAILY PRN
Status: DISCONTINUED | OUTPATIENT
Start: 2018-09-21 | End: 2018-09-21 | Stop reason: HOSPADM

## 2018-09-21 RX ORDER — ASPIRIN 81 MG/1
81 TABLET ORAL DAILY
Status: DISCONTINUED | OUTPATIENT
Start: 2018-09-21 | End: 2018-09-21 | Stop reason: HOSPADM

## 2018-09-21 RX ORDER — OMEPRAZOLE 40 MG/1
40 CAPSULE, DELAYED RELEASE ORAL DAILY
Qty: 30 CAPSULE | Refills: 0 | Status: SHIPPED | OUTPATIENT
Start: 2018-09-21

## 2018-09-21 RX ORDER — LISINOPRIL 10 MG/1
10 TABLET ORAL DAILY
Qty: 30 TABLET | Refills: 0 | Status: SHIPPED | OUTPATIENT
Start: 2018-09-21

## 2018-09-21 RX ORDER — DOBUTAMINE HYDROCHLORIDE 100 MG/100ML
10-50 INJECTION INTRAVENOUS CONTINUOUS
Status: DISCONTINUED | OUTPATIENT
Start: 2018-09-21 | End: 2018-09-21 | Stop reason: HOSPADM

## 2018-09-21 RX ORDER — FENOFIBRATE 145 MG/1
145 TABLET, COATED ORAL DAILY
Qty: 30 TABLET | Refills: 0 | Status: SHIPPED | OUTPATIENT
Start: 2018-09-21

## 2018-09-21 RX ORDER — ALBUTEROL SULFATE 1.25 MG/3ML
1 SOLUTION RESPIRATORY (INHALATION) EVERY 6 HOURS PRN
Status: DISCONTINUED | OUTPATIENT
Start: 2018-09-21 | End: 2018-09-21 | Stop reason: HOSPADM

## 2018-09-21 RX ORDER — SODIUM CHLORIDE AND POTASSIUM CHLORIDE 150; 450 MG/100ML; MG/100ML
250 INJECTION, SOLUTION INTRAVENOUS CONTINUOUS PRN
Status: DISCONTINUED | OUTPATIENT
Start: 2018-09-21 | End: 2018-09-21 | Stop reason: HOSPADM

## 2018-09-21 RX ORDER — ALBUTEROL SULFATE 90 UG/1
2 AEROSOL, METERED RESPIRATORY (INHALATION) EVERY 4 HOURS PRN
Qty: 1 INHALER | Refills: 0 | Status: SHIPPED | OUTPATIENT
Start: 2018-09-21

## 2018-09-21 RX ORDER — DEXTROSE, SODIUM CHLORIDE, AND POTASSIUM CHLORIDE 5; .45; .15 G/100ML; G/100ML; G/100ML
150 INJECTION INTRAVENOUS CONTINUOUS PRN
Status: DISCONTINUED | OUTPATIENT
Start: 2018-09-21 | End: 2018-09-21 | Stop reason: HOSPADM

## 2018-09-21 RX ORDER — ALUMINA, MAGNESIA, AND SIMETHICONE 2400; 2400; 240 MG/30ML; MG/30ML; MG/30ML
15 SUSPENSION ORAL EVERY 6 HOURS PRN
Status: DISCONTINUED | OUTPATIENT
Start: 2018-09-21 | End: 2018-09-21 | Stop reason: HOSPADM

## 2018-09-21 RX ORDER — ONDANSETRON 2 MG/ML
4 INJECTION INTRAMUSCULAR; INTRAVENOUS EVERY 6 HOURS PRN
Status: DISCONTINUED | OUTPATIENT
Start: 2018-09-21 | End: 2018-09-21 | Stop reason: HOSPADM

## 2018-09-21 RX ORDER — ACETAMINOPHEN 325 MG/1
650 TABLET ORAL EVERY 4 HOURS PRN
Status: DISCONTINUED | OUTPATIENT
Start: 2018-09-21 | End: 2018-09-21 | Stop reason: HOSPADM

## 2018-09-21 RX ORDER — BLOOD-GLUCOSE METER
1 KIT MISCELLANEOUS AS NEEDED
Qty: 1 EACH | Refills: 0 | Status: SHIPPED | OUTPATIENT
Start: 2018-09-21

## 2018-09-21 RX ORDER — LANCETS
EACH MISCELLANEOUS
Qty: 120 EACH | Refills: 0 | Status: SHIPPED | OUTPATIENT
Start: 2018-09-21

## 2018-09-21 RX ORDER — SODIUM CHLORIDE 450 MG/100ML
250 INJECTION, SOLUTION INTRAVENOUS CONTINUOUS
Status: DISCONTINUED | OUTPATIENT
Start: 2018-09-22 | End: 2018-09-21

## 2018-09-21 RX ADMIN — SODIUM CHLORIDE 100 ML/HR: 9 INJECTION, SOLUTION INTRAVENOUS at 05:20

## 2018-09-21 RX ADMIN — ENOXAPARIN SODIUM 40 MG: 40 INJECTION SUBCUTANEOUS at 09:46

## 2018-09-21 RX ADMIN — ATROPINE SULFATE 1.3 MG: 0.1 INJECTION PARENTERAL at 08:48

## 2018-09-21 RX ADMIN — INSULIN LISPRO 4 UNITS: 100 INJECTION, SOLUTION INTRAVENOUS; SUBCUTANEOUS at 12:21

## 2018-09-21 RX ADMIN — Medication 10 MCG/KG/MIN: at 08:19

## 2018-09-21 RX ADMIN — ASPIRIN 81 MG: 81 TABLET ORAL at 09:46

## 2018-09-21 RX ADMIN — INSULIN LISPRO 4 UNITS: 100 INJECTION, SOLUTION INTRAVENOUS; SUBCUTANEOUS at 09:46

## 2018-09-21 RX ADMIN — SODIUM CHLORIDE 250 ML/HR: 9 INJECTION, SOLUTION INTRAVENOUS at 02:46

## 2018-09-21 RX ADMIN — PERFLUTREN 8.48 MG: 6.52 INJECTION, SUSPENSION INTRAVENOUS at 08:19

## 2018-09-21 RX ADMIN — POTASSIUM CHLORIDE AND SODIUM CHLORIDE 250 ML/HR: 450; 150 INJECTION, SOLUTION INTRAVENOUS at 03:59

## 2018-09-21 RX ADMIN — INSULIN DETEMIR 20 UNITS: 100 INJECTION, SOLUTION SUBCUTANEOUS at 06:56

## 2018-09-21 RX ADMIN — CLONAZEPAM 0.5 MG: 0.5 TABLET ORAL at 01:37

## 2018-09-21 NOTE — PLAN OF CARE
Problem: Patient Care Overview  Goal: Plan of Care Review  Outcome: Ongoing (interventions implemented as appropriate)   09/21/18 0414   Coping/Psychosocial   Plan of Care Reviewed With patient   Plan of Care Review   Progress improving   OTHER   Outcome Summary pt admitted via ER on insulin gtt after running out of insulin and all other home meds. Not in DKA.  consult placed.        Problem: Hyperglycemia, Persistent (Adult)  Goal: Signs and Symptoms of Listed Potential Problems Will be Absent, Minimized or Managed (Hyperglycemia, Persistent)  Outcome: Ongoing (interventions implemented as appropriate)      Problem: Fall Risk (Adult)  Goal: Identify Related Risk Factors and Signs and Symptoms  Outcome: Ongoing (interventions implemented as appropriate)    Goal: Absence of Fall  Outcome: Ongoing (interventions implemented as appropriate)

## 2018-09-21 NOTE — NURSING NOTE
Pt states he has Medicaid coverage for meds.  He has been here for several months but no PCP.  He says he has anxiety and hates hospitals.  He says he has had no Diabetes Education and thought his Diabetes was not a problem.  He has an Aunt here.

## 2018-09-21 NOTE — PROGRESS NOTES
Continued Stay Note  RAFAEL Byrne     Patient Name: Pio Mckeon  MRN: [id number]  Today's Date: 9/21/2018    Admit Date: 9/20/2018          Discharge Plan     Row Name 09/21/18 1056       Plan    Plan home    Plan Comments Attempted to speak to patient in room.  Patient is very lethargic and falls asleep in between every question.  Only information I got from patient is that he thinks he is going to permanently live in [place].  Patient had [place] Medicaid that was covering his medications and he will need to apply for [place] medicaid if he plans to stay in [place].  Once he has [place] medicaid it will cover his medications including insulin.  Will attempt to speak to patient again once he is more alert.               Discharge Codes    No documentation.           Lorena Moody RN

## 2018-09-21 NOTE — H&P
[facility]  HISTORY AND PHYSICAL    Date of Admission: 9/20/2018  Primary Care Physician: Provider, No Known    Subjective     Chief Complaint: chest pain    History of Present Illness  Pio Mckeon is a 23 year old male with a past medical history of insulin dependent diabetes mellitus with neuropathy, asthma, hypertension, TIA, and frequent chest pain.  Patient was in his usual state of health until today at which time while eating he developed severe crushing chest pain.  He denies shortness of breathing, diaphoresis or nausea with the event.  He states he has had these before with associated shortness of breathing but not this time.  He has been on a diet where he eats solid food twice a week and the remainder of days he drinks juices of some type preferably apple juice or cranberry juice.  EMS was called and patient was transferred to [facility] emergency Department.  He is noted to have glucose of 910.  Currently he is feeling much better still having mild chest pain.  He states in previous visits he has left AGAINST MEDICAL ADVICE however he is agreeable to stay for complete workup this time.  He is not taking any insulin.  Feel many of his issues are secondary to moving here from [place] with no job no ability to pay for needed medications.  Patient is admitted to critical care for further evaluation and treatment    Review of Systems   10 point review of systems was completed, all negative except those discussed in HPI.     Past Medical History:   Past Medical History:   Diagnosis Date   • Asthma    • Atelectasis    • Depression    • Diabetes mellitus (CMS/[facility])    • Hyperlipidemia    • Hypertension    • Neuropathy    • PTSD (post-traumatic stress disorder)    • TIA (transient ischemic attack)        Past Surgical History:   Past Surgical History:   Procedure Laterality Date   • HERNIA REPAIR         Family History: family history includes Heart  "failure in his father and maternal grandmother.    Social History:  reports that he has been smoking Cigars.  He does not have any smokeless tobacco history on file. He reports that he drinks alcohol. He reports that he uses drugs, including Marijuana.    Code Status: Full, his mother will speak for him if unable to speak for himself.      Allergies:  Allergies   Allergen Reactions   • Penicillins Anaphylaxis   • Keflex [Cephalexin] Hives       Medications:  Prior to Admission medications    Medication Sig Start Date End Date Taking? Authorizing Provider   albuterol (ACCUNEB) 1.25 MG/3ML nebulizer solution Take 1 ampule by nebulization Every 6 (Six) Hours As Needed for Wheezing.   Yes Betsey Zepeda MD   albuterol (PROVENTIL HFA;VENTOLIN HFA) 108 (90 Base) MCG/ACT inhaler Inhale 2 puffs Every 4 (Four) Hours As Needed for Wheezing.   Yes Betsey Zepeda MD   cyclobenzaprine (FLEXERIL) 10 MG tablet Take 1 tablet by mouth 3 (Three) Times a Day As Needed for Muscle Spasms. 8/7/18   Jose Han MD   erythromycin (ROMYCIN) 5 MG/GM ophthalmic ointment Administer  to both eyes Every 4 (Four) Hours While Awake. 7/10/18   Jazmine Mercedes PA   insulin detemir (LEVEMIR) 100 UNIT/ML injection Inject 20 Units under the skin Daily.    ProviderBetsey MD   ketorolac (TORADOL) 10 MG tablet Take 1 tablet by mouth Every 6 (Six) Hours As Needed for Moderate Pain . 7/9/18   Jose Han MD   ketotifen ( KETOTIFEN FUMARATE) 0.025 % ophthalmic solution Administer 1 drop to both eyes 2 (Two) Times a Day. 7/10/18   Jazmine Mercedes PA   Pregabalin (LYRICA PO) Take  by mouth.    ProviderBetsey MD       Objective     BP (!) 140/36   Pulse 97   Temp 99.1 °F (37.3 °C)   Resp 17   Ht 175.3 cm (69\")   Wt (!) 150 kg (330 lb)   SpO2 96%   BMI 48.73 kg/m²      Physical Exam   Constitutional: He is oriented to person, place, and time. He appears well-developed and " well-nourished. No distress.   obese   HENT:   Head: Normocephalic and atraumatic.   Eyes: Pupils are equal, round, and reactive to light. Conjunctivae and EOM are normal. No scleral icterus.   Neck: Normal range of motion. Neck supple. No JVD present. No tracheal deviation present.   Cardiovascular: Normal rate, regular rhythm, normal heart sounds and intact distal pulses.  Exam reveals no gallop.    No murmur heard.  Pulmonary/Chest: Effort normal. No respiratory distress. He has wheezes (scattered, expiratory). He has no rales.   Abdominal: Soft. Bowel sounds are normal. He exhibits no distension. There is no tenderness. There is no guarding.   Musculoskeletal: Normal range of motion. He exhibits no edema.   Neurological: He is alert and oriented to person, place, and time.   Skin: Skin is warm and dry. No rash noted. He is not diaphoretic. No erythema. No pallor.   Psychiatric: He has a normal mood and affect. His behavior is normal.   Vitals reviewed.      Pertinent Data:   Lab Results (last 72 hours)     Procedure Component Value Units Date/Time    Blood Gas, Arterial [[id number]]  (Abnormal) Collected:  09/20/18 2206    Specimen:  Arterial Blood Updated:  09/20/18 2209     Site Right Radial     Americo's Test Positive     pH, Arterial 7.357 pH units      pCO2, Arterial 39.2 mm Hg      pO2, Arterial 114.0 (H) mm Hg      HCO3, Arterial 22.0 mmol/L      Base Excess, Arterial -3.2 (L) mmol/L      O2 Saturation, Arterial 98.8 %      Temperature 37.0 C      Barometric Pressure for Blood Gas 750 mmHg      Modality Nasal Cannula     Flow Rate 2.0 lpm      Ventilator Mode NA     Collected by [id number]    Urinalysis With Microscopic If Indicated (No Culture) - Urine, Clean Catch [[id number]]  (Abnormal) Collected:  09/20/18 2133    Specimen:  Urine from Urine, Clean Catch Updated:  09/20/18 2145     Color, UA Yellow     Appearance, UA Clear     pH, UA 5.5     Specific Gravity, UA >1.030 (H)     Glucose, UA >=1000 mg/dL (3+)  (A)     Ketones, UA Negative     Bilirubin, UA Negative     Blood, UA Negative     Protein, UA Negative     Leuk Esterase, UA Negative     Nitrite, UA Negative     Urobilinogen, UA 0.2 E.U./dL    Narrative:       Urine microscopic not indicated.    Hemoglobin A1c [[id number]] Collected:  09/20/18 2054    Specimen:  Blood Updated:  09/20/18 2138    Acetone [[id number]]  (Normal) Collected:  09/20/18 2054    Specimen:  Blood Updated:  09/20/18 2135     Acetone Negative    Troponin [[id number]]  (Normal) Collected:  09/20/18 2054    Specimen:  Blood Updated:  09/20/18 2124     Troponin I <0.012 ng/mL     Comprehensive Metabolic Panel [[id number]]  (Abnormal) Collected:  09/20/18 2054    Specimen:  Blood Updated:  09/20/18 2120     Glucose 910 (C) mg/dL      BUN 7 mg/dL      Creatinine 0.96 mg/dL      Sodium 131 (L) mmol/L      Potassium 5.1 mmol/L      Chloride 93 (L) mmol/L      CO2 19.0 (L) mmol/L      Calcium 9.4 mg/dL      Total Protein 7.2 g/dL      Albumin 4.60 g/dL      ALT (SGPT) 19 U/L      AST (SGOT) 32 U/L      Alkaline Phosphatase 97 U/L      Total Bilirubin 1.1 (H) mg/dL      eGFR  African Amer 116 mL/min/1.73      Globulin 2.6 gm/dL      A/G Ratio 1.8 g/dL      BUN/Creatinine Ratio 7.3     Anion Gap 19.0 (H) mmol/L     CBC Auto Differential [[id number]]  (Abnormal) Collected:  09/20/18 2054    Specimen:  Blood Updated:  09/20/18 2115     WBC 8.21 10*3/mm3      RBC 4.89 10*6/mm3      Hemoglobin 13.7 (L) g/dL      Hematocrit 38.6 (L) %      MCV 78.9 (L) fL      MCH 28.0 pg      MCHC 35.5 g/dL      RDW 12.4 %      RDW-SD 35.0 (L) fl      MPV 13.9 (H) fL      Platelets 199 10*3/mm3      Neutrophil % 65.8 %      Lymphocyte % 28.5 %      Monocyte % 5.1 %      Eosinophil % 0.0 %      Basophil % 0.4 %      Neutrophils, Absolute 5.40 10*3/mm3      Lymphocytes, Absolute 2.34 10*3/mm3      Monocytes, Absolute 0.42 10*3/mm3      Eosinophils, Absolute 0.00 10*3/mm3      Basophils, Absolute 0.03 10*3/mm3     D-dimer,  Quantitative [[id number]]  (Normal) Collected:  09/20/18 2054    Specimen:  Blood Updated:  09/20/18 2111     D-Dimer, Quantitative 0.22 mg/L (FEU)    Imaging Results (last 24 hours)     Procedure Component Value Units Date/Time    XR Chest 1 View [[id number]] Collected:  09/20/18 2122     Updated:  09/20/18 2126    Narrative:       EXAMINATION: XR CHEST 1 VW- 9/20/2018 9:22 PM CDT     HISTORY: Chest pain     COMPARISON: 8/6/2018     FINDINGS:  The heart and mediastinal contours are stable compared to the previous  exam. Heart is magnified but felt to be normal in size. The lungs appear  clear. There is no appreciable pneumothorax or pleural effusion.       Impression:       No evidence of acute cardiopulmonary process.  This report was finalized on 09/20/2018 21:23 by Dr. Tristin Juarez MD.          I have personally reviewed the radiology studies and ECG obtained at time of admission.     Assessment / Plan     Assessment:   Insulin dependent diabetes, poor medical compliance and complicated by hyperglycemia  Chest pain  Hypertension  Asthma  Obesity    Plan:   1. Admit as inpatient - critical care  2. Insulin drip with glucose monitoring per protocol  3. Consult diabetes educator  4. Stress test  5. Serial troponins  6. Albuterol nebs, supplemental O2 as needed  7. Home medications reviewed, not restarted - not currently taking  8. DVT prophylaxis with Lovenox    I discussed the patient's findings and my recommendations with: Vito Trejo MD  Time spent: 40 minutes      BIB Hopkins  09/20/18   10:58 PM     I personally evaluated and examined the patient in conjunction with BIB Modi and agree with the assessment, treatment plan, and disposition of the patient as recorded by her. My history, exam, and further recommendations are: Patient reports that he has not had a glucometer since this past weekend and has not checked his blood sugars at all.  He also reports that he is not taking  "insulin in \"months\".  He has recently started a new diet and attempts for weight loss, and has been consuming apple juice and cranberry juice as part of this diet.  He also reports drinking alcohol yesterday as well, but states that he normally does not drink alcohol on a regular basis.  He reports that the highest blood sugar he can ever recall was \"in the 400s\".  On admission, his blood sugar was over 900.  He does not have typical diabetic ketoacidosis as his urinary ketones are negative, serum acetone is negative, and blood pH is normal.  He does have an elevated anion gap, and I will check a serum osmolality to evaluate for hyperglycemic hyperosmolar state.  He will be admitted to the intensive care unit and insulin drip will be started per protocol.  Patient has received 2 L of IV fluids in the emergency room and will continue a maintenance rate of IV fluids.  Patient will remain nothing by mouth for the time being.  Serial cardiac enzymes will be obtained.  Patient recently had an echocardiogram which revealed no acute findings, and records indicate that an outpatient stress test was ordered for July 12 however patient was a no-show.  Anticipate patient will have cardiac stress testing completed prior to discharge.  We will add Maalox when necessary.  We will check lipase.  Workup ongoing.        Vito Trejo MD  09/20/18  11:07 PM  "

## 2018-09-21 NOTE — PROGRESS NOTES
"Pharmacy Dosing Service  Anticoagulant  Enoxaparin    Assessment/Action/Plan:  BMI >40.  Dosage adjusted to Lovenox 40 mg q 12hrs.  Pharmacy will continue to monitor and adjust as needed.     Subjective:  Pio Mckeon is a 23 y.o. male on Enoxaparin 40 mg SQ every 24 hours for indication of VTE prophylaxis.  Objective:  [Ht: 177.8 cm (70\"); Wt: (!) 146 kg (321 lb 14 oz); BMI: Body mass index is 46.18 kg/m².]  Estimated Creatinine Clearance: 239.6 mL/min (by C-G formula based on SCr of 0.68 mg/dL).   Lab Results   Component Value Date    INR 1.01 08/06/2018    INR 1.00 07/08/2018    PROTIME 13.6 08/06/2018    PROTIME 13.5 07/08/2018      Lab Results   Component Value Date    HGB 13.7 (L) 09/20/2018    HGB 13.3 (L) 08/06/2018    HGB 13.4 (L) 07/08/2018      Lab Results   Component Value Date     09/20/2018     08/06/2018     07/08/2018       LYNDSEY Wu RPH  09/21/18 9:03 AM     "

## 2018-09-21 NOTE — ED PROVIDER NOTES
Subjective   Patient is a 23-year-old male with history of diabetes, asthma, hypertension, hyperlipidemia presents with chest pain.  Pain started approximately 1 hour ago while eating.  Substernal without radiation.  Denies obvious relieving factors.  Denies choking or food bolus.  Denies vomiting, nausea, diaphoresis, abdominal pain, shortness of breath, leg pain, history of DVT or PE.  Patient has had an echocardiogram recently back in August which was essentially normal.  He does have family history of CAD.  EMS was called.  Patient was slightly tachycardic.  He received aspirin and nitroglycerin with some relief of his pain.  Currently described as pressure.            Review of Systems   Constitutional: Negative for chills, diaphoresis, fatigue and fever.   HENT: Negative for sore throat.    Eyes: Negative for visual disturbance.   Respiratory: Negative for cough, choking, chest tightness and shortness of breath.    Cardiovascular: Positive for chest pain. Negative for palpitations and leg swelling.   Gastrointestinal: Negative for abdominal pain, diarrhea, nausea and vomiting.   Genitourinary: Negative for hematuria.   Musculoskeletal: Negative for back pain.   Skin: Negative for rash.   Neurological: Negative for dizziness, seizures, syncope and headaches.       Past Medical History:   Diagnosis Date   • Asthma    • Atelectasis    • Cardiomegaly    • Depression    • Diabetes mellitus (CMS/HCC)    • Hyperlipidemia    • Hypertension    • Neuropathy    • PTSD (post-traumatic stress disorder)    • Stroke (CMS/HCC)     tia       Allergies   Allergen Reactions   • Penicillins Anaphylaxis   • Keflex [Cephalexin] Hives       Past Surgical History:   Procedure Laterality Date   • HERNIA REPAIR         Family History   Problem Relation Age of Onset   • Heart failure Father    • Heart failure Maternal Grandmother        Social History     Social History   • Marital status: Single     Social History Main Topics   •  Smoking status: Current Some Day Smoker     Types: Cigars   • Alcohol use Yes      Comment: on occassion   • Drug use: Yes     Types: Marijuana      Comment: on occassion     Other Topics Concern   • Not on file       Lab Results (last 24 hours)     Procedure Component Value Units Date/Time    CBC & Differential [[id number]] Collected:  09/20/18 2054    Specimen:  Blood Updated:  09/20/18 2115    Narrative:       The following orders were created for panel order CBC & Differential.  Procedure                               Abnormality         Status                     ---------                               -----------         ------                     Manual Differential[[id number]]                                                         CBC Auto Differential[[id number]]        Abnormal            Final result                 Please view results for these tests on the individual orders.    Comprehensive Metabolic Panel [[id number]]  (Abnormal) Collected:  09/20/18 2054    Specimen:  Blood Updated:  09/20/18 2120     Glucose 910 (C) mg/dL      BUN 7 mg/dL      Creatinine 0.96 mg/dL      Sodium 131 (L) mmol/L      Potassium 5.1 mmol/L      Chloride 93 (L) mmol/L      CO2 19.0 (L) mmol/L      Calcium 9.4 mg/dL      Total Protein 7.2 g/dL      Albumin 4.60 g/dL      ALT (SGPT) 19 U/L      AST (SGOT) 32 U/L      Alkaline Phosphatase 97 U/L      Total Bilirubin 1.1 (H) mg/dL      eGFR  African Amer 116 mL/min/1.73      Globulin 2.6 gm/dL      A/G Ratio 1.8 g/dL      BUN/Creatinine Ratio 7.3     Anion Gap 19.0 (H) mmol/L     Troponin [[id number]]  (Normal) Collected:  09/20/18 2054    Specimen:  Blood Updated:  09/20/18 2124     Troponin I <0.012 ng/mL     CBC Auto Differential [[id number]]  (Abnormal) Collected:  09/20/18 2054    Specimen:  Blood Updated:  09/20/18 2115     WBC 8.21 10*3/mm3      RBC 4.89 10*6/mm3      Hemoglobin 13.7 (L) g/dL      Hematocrit 38.6 (L) %      MCV 78.9 (L) fL      MCH 28.0 pg      MCHC 35.5  g/dL      RDW 12.4 %      RDW-SD 35.0 (L) fl      MPV 13.9 (H) fL      Platelets 199 10*3/mm3      Neutrophil % 65.8 %      Lymphocyte % 28.5 %      Monocyte % 5.1 %      Eosinophil % 0.0 %      Basophil % 0.4 %      Neutrophils, Absolute 5.40 10*3/mm3      Lymphocytes, Absolute 2.34 10*3/mm3      Monocytes, Absolute 0.42 10*3/mm3      Eosinophils, Absolute 0.00 10*3/mm3      Basophils, Absolute 0.03 10*3/mm3     D-dimer, Quantitative [[id number]]  (Normal) Collected:  09/20/18 2054    Specimen:  Blood Updated:  09/20/18 2111     D-Dimer, Quantitative 0.22 mg/L (FEU)     Narrative:       Reference Range is 0-0.50 mg/L FEU. However, results <0.50 mg/L FEU tends to rule out DVT or PE. Results >0.50 mg/L FEU are not useful in predicting absence or presence of DVT or PE.    Acetone [[id number]]  (Normal) Collected:  09/20/18 2054    Specimen:  Blood Updated:  09/20/18 2135     Acetone Negative    Hemoglobin A1c [[id number]] Collected:  09/20/18 2054    Specimen:  Blood Updated:  09/20/18 2138    Urinalysis With Microscopic If Indicated (No Culture) - Urine, Clean Catch [[id number]]  (Abnormal) Collected:  09/20/18 2133    Specimen:  Urine from Urine, Clean Catch Updated:  09/20/18 2145     Color, UA Yellow     Appearance, UA Clear     pH, UA 5.5     Specific Gravity, UA >1.030 (H)     Glucose, UA >=1000 mg/dL (3+) (A)     Ketones, UA Negative     Bilirubin, UA Negative     Blood, UA Negative     Protein, UA Negative     Leuk Esterase, UA Negative     Nitrite, UA Negative     Urobilinogen, UA 0.2 E.U./dL    Narrative:       Urine microscopic not indicated.    Blood Gas, Arterial [[id number]]  (Abnormal) Collected:  09/20/18 2206    Specimen:  Arterial Blood Updated:  09/20/18 2209     Site Right Radial     Americo's Test Positive     pH, Arterial 7.357 pH units      pCO2, Arterial 39.2 mm Hg      pO2, Arterial 114.0 (H) mm Hg      HCO3, Arterial 22.0 mmol/L      Base Excess, Arterial -3.2 (L) mmol/L      O2 Saturation, Arterial  98.8 %      Temperature 37.0 C      Barometric Pressure for Blood Gas 750 mmHg      Modality Nasal Cannula     Flow Rate 2.0 lpm      Ventilator Mode NA     Collected by [id number]          Objective   Physical Exam   Constitutional: He is oriented to person, place, and time. He appears well-developed and well-nourished. He is cooperative.  Non-toxic appearance. He does not appear ill.   HENT:   Head: Atraumatic.   Mouth/Throat: Oropharynx is clear and moist and mucous membranes are normal.   Eyes: Pupils are equal, round, and reactive to light. EOM are normal.   Neck: Normal range of motion. Neck supple.   Cardiovascular: Regular rhythm, normal heart sounds and intact distal pulses.  Tachycardia present.  Exam reveals no gallop and no friction rub.    No murmur heard.  Pulmonary/Chest: Effort normal and breath sounds normal. No respiratory distress. He has no wheezes. He has no rhonchi. He has no rales. He exhibits no tenderness.   Abdominal: Soft. There is no tenderness. There is no rebound and no guarding.   Musculoskeletal: Normal range of motion. He exhibits no edema, tenderness or deformity.   Neurological: He is alert and oriented to person, place, and time. No cranial nerve deficit or sensory deficit. He exhibits normal muscle tone.   Skin: Skin is warm and dry. Capillary refill takes less than 2 seconds.   Psychiatric: He has a normal mood and affect.   Nursing note and vitals reviewed.      ECG 12 Lead    Date/Time: 9/20/2018 8:56 PM  Performed by: REBECA GUTIERREZ  Authorized by: REBECA GUTIERREZ   Interpreted by physician  Comparison: compared with previous ECG   Similar to previous ECG  Rhythm: sinus tachycardia  Rate: tachycardic  QRS axis: left  Conduction: conduction normal  ST Segments: ST segments normal  T depression: III  Clinical impression: non-specific ECG  Comments: Other than sinus tachycardia EKG unchanged      Critical Care  Performed by: REBECA GUTIERREZ  Authorized by: MATT  "REBECA SANTANA     Critical care provider statement:     Critical care time (minutes):  40    Critical care time was exclusive of:  Separately billable procedures and treating other patients    Critical care was necessary to treat or prevent imminent or life-threatening deterioration of the following conditions:  Endocrine crisis    Critical care was time spent personally by me on the following activities:  Development of treatment plan with patient or surrogate, evaluation of patient's response to treatment, obtaining history from patient or surrogate, ordering and performing treatments and interventions, ordering and review of laboratory studies, ordering and review of radiographic studies, pulse oximetry, re-evaluation of patient's condition and review of old charts    I assumed direction of critical care for this patient from another provider in my specialty: no               XR Chest 1 View   Final Result   No evidence of acute cardiopulmonary process.   This report was finalized on 09/20/2018 21:23 by Dr. Tristin Juarez MD.          BP (!) 140/36   Pulse 97   Temp 99.1 °F (37.3 °C)   Resp 17   Ht 175.3 cm (69\")   Wt (!) 150 kg (330 lb)   SpO2 96%   BMI 48.73 kg/m²     ED Course    ED Course as of Sep 20 2242   Thu Sep 20, 2018   2130 Glucose: (!!) 910 [TH]   2130 Sodium: (!) 131 [TH]   2130 Potassium: 5.1 [TH]   2130 Chloride: (!) 93 [TH]   2130 Anion Gap: (!) 19.0 [TH]   2153 Acetone: Negative [TH]   2153 Ketones, UA: Negative [TH]   2230 pH, Arterial: 7.357 [TH]   2231 HCO3, Arterial: 22.0 [TH]   2239 This is a 23-year-old male who presents in setting of chest pain.  Patient is diabetic and has been off his insulin.  Blood glucose was actually 910.  He does have elevated anion gap.  No evidence of acidosis or ketosis.  We did give him 10 units of insulin and 2 L of fluid.  We will continue fluids and place him on insulin drip and admit to ICU for further care.  [TH]      ED Course User Index  [TH] " Sanket Byrd MD       Medications   sodium chloride 0.9 % flush 10 mL (not administered)   nitroglycerin (NITROSTAT) SL tablet 0.4 mg (0.4 mg Sublingual Given 9/20/18 2102)   insulin regular (humuLIN R,novoLIN R) 100 Units in sodium chloride 0.9 % 100 mL (1 Units/mL) infusion (not administered)   insulin regular (humuLIN R,novoLIN R) injection 15 Units (not administered)   morphine injection 4 mg (4 mg Intravenous Given 9/20/18 2057)   insulin regular (humuLIN R,novoLIN R) injection 10 Units (10 Units Intravenous Given 9/20/18 2137)   sodium chloride 0.9 % bolus 1,000 mL (1,000 mL Intravenous New Bag 9/20/18 2130)   sodium chloride 0.9 % bolus 1,000 mL (1,000 mL Intravenous New Bag 9/20/18 2133)           HEART Score (for prediction of 6-week risk of major adverse cardiac event) reviewed and/or performed as part of the patient evaluation and treatment planning process.  The result associated with this review/performance is: 2    PERC Rule (for pulmonary embolism) reviewed and/or performed as part of the patient evaluation and treatment planning process.  The result associated with this review/performance is: 1      MDM  Number of Diagnoses or Management Options  Chest pain, unspecified type: new and requires workup  Hyperglycemia: established and worsening     Amount and/or Complexity of Data Reviewed  Clinical lab tests: reviewed and ordered  Tests in the radiology section of CPT®: reviewed and ordered  Tests in the medicine section of CPT®: reviewed  Review and summarize past medical records: yes    Risk of Complications, Morbidity, and/or Mortality  Presenting problems: moderate  Diagnostic procedures: moderate  Management options: moderate    Critical Care  Total time providing critical care: 30-74 minutes    Patient Progress  Patient progress: stable      Final diagnoses:   Hyperglycemia   Chest pain, unspecified type          Sanket Byrd MD  09/20/18 5451

## 2018-09-21 NOTE — PROGRESS NOTES
Discharge Planning Assessment  [facility]     Patient Name: Pio Mckeon  MRN: [id number]  Today's Date: 9/21/2018    Admit Date: 9/20/2018          Discharge Needs Assessment     Row Name 09/21/18 1342       Living Environment    Lives With other relative(s)    Current Living Arrangements home/apartment/condo    Primary Care Provided by self    Provides Primary Care For no one    Family Caregiver if Needed other (see comments)    Quality of Family Relationships supportive    Able to Return to Prior Arrangements yes       Resource/Environmental Concerns    Resource/Environmental Concerns none    Transportation Concerns car, none       Transition Planning    Patient/Family Anticipates Transition to home with family    Patient/Family Anticipated Services at Transition none    Transportation Anticipated family or friend will provide       Discharge Needs Assessment    Readmission Within the Last 30 Days no previous admission in last 30 days    Concerns to be Addressed care coordination/care conferences;discharge planning    Anticipated Changes Related to Illness none    Equipment Needed After Discharge glucometer    Current Discharge Risk chronically ill;non-[facility]    Discharge Coordination/Progress SW and  spoke with patient.  Patient has Medicare and [place] Medicaid.  Patient states his insulin has been covered by his insurance since he has moved to [place].  Patient states he does not have a local PCP.  SW advised patient he could follow up with [facility] clinic.  Patient had an up to date cell phone on his tray table he was viewing videos on and SW encouraged him to use that cell phone to contact [facility] clinic to schedule an appointment.  Patient was also advised he would need to transfer his Medicaid from [place] to [place].  Patient expressed concern over his diet restrictions and stated he needed to eat real food and didn't like being in the hospital.            Discharge Plan     Row Name 09/21/18 5296        Plan    Plan home    Plan Comments Attempted to speak to patient in room.  Patient is very lethargic and falls asleep in between every question.  Only information I got from patient is that he thinks he is going to permanently live in [place].  Patient had [place] Medicaid that was covering his medications and he will need to apply for [place] medicaid if he plans to stay in [place].  Once he has [place] medicaid it will cover his medications including insulin.  Will attempt to speak to patient again once he is more alert.         Destination     No service coordination in this encounter.      Durable Medical Equipment     No service coordination in this encounter.      Dialysis/Infusion     No service coordination in this encounter.      Home Medical Care     No service coordination in this encounter.      Social Care     No service coordination in this encounter.                Demographic Summary    No documentation.           Functional Status    No documentation.           Psychosocial    No documentation.           Abuse/Neglect    No documentation.           Legal    No documentation.           Substance Abuse    No documentation.           Patient Forms    No documentation.         NELY Patel

## 2018-09-21 NOTE — PLAN OF CARE
Problem: Patient Care Overview  Goal: Plan of Care Review  Outcome: Outcome(s) achieved Date Met: 09/21/18 09/21/18 7875   Coping/Psychosocial   Plan of Care Reviewed With patient   Plan of Care Review   Progress improving   OTHER   Outcome Summary CCHO diet, oral intake insuffient at this time. Provided pt with DM education. Con to follow.

## 2018-09-21 NOTE — DISCHARGE SUMMARY
[facility] Medicine Services  DISCHARGE SUMMARY       Date of Admission: 9/20/2018  Date of Discharge:  9/21/2018  Primary Care Physician: Daryl Montesinos Jr., MD    Discharge Diagnoses:  Insulin-dependent diabetes with severe hyperglycemia   Hypertension  Hypertriglyceridemia  Morbid Obesity (BMI 46.2)  Bipolar disorder-untreated  Poor compliance    Procedures Performed: Stress echocardiogram   Interpretation Summary     · Normal baseline ECG noted.  · The patient reported chest pain during the Dobutamine infusion.  · Despite complaints of chest discomfort with Dobutamine, there was no clinically significant ST segment deviation noted during stress.  · Segment augmentation had a normal response to stress.  · Normal stress echo with no significant ECG or echocardiographic evidence for myocardial ischemia.        Pertinent Test Results:   Lab Results (last 72 hours)     Procedure Component Value Units Date/Time    POC Glucose Once [[id number]]  (Abnormal) Collected:  09/21/18 1210    Specimen:  Blood Updated:  09/21/18 1221     Glucose 247 (H) mg/dL      Comment: : [id number] MedioMeter ID: [id number]       POC Glucose Once [[id number]]  (Abnormal) Collected:  09/21/18 0935    Specimen:  Blood Updated:  09/21/18 0951     Glucose 220 (H) mg/dL      Comment: : [id number] ABC Live AprilMeter ID: [id number]       Troponin [[id number]]  (Normal) Collected:  09/21/18 0653    Specimen:  Blood Updated:  09/21/18 0724     Troponin I <0.012 ng/mL     Hemoglobin A1c [[id number]] Collected:  09/20/18 2054    Specimen:  Blood Updated:  09/21/18 0724     Hemoglobin A1C 11.4 %     Narrative:       Less than 6.0           Non-Diabetic Range  6.0-7.0                 ADA Therapeutic Target  Greater than 7.0        Action Suggested    Basic Metabolic Panel [[id number]]  (Abnormal) Collected:  09/21/18 0653    Specimen:  Blood Updated:  09/21/18 0713     Glucose 180 (H) mg/dL      BUN 7  mg/dL      Creatinine 0.68 mg/dL      Sodium 141 mmol/L      Potassium 3.9 mmol/L      Chloride 105 mmol/L      CO2 26.0 mmol/L      Calcium 8.5 mg/dL      eGFR  African Amer >150 mL/min/1.73      BUN/Creatinine Ratio 10.3     Anion Gap 10.0 mmol/L     POC Glucose Once [[id number]]  (Abnormal) Collected:  09/21/18 0653    Specimen:  Blood Updated:  09/21/18 0705     Glucose 182 (H) mg/dL      Comment: : [id number] Whelan KathyMeter ID: [id number]       POC Glucose Once [[id number]]  (Abnormal) Collected:  09/21/18 0504    Specimen:  Blood Updated:  09/21/18 0515     Glucose 186 (H) mg/dL      Comment: : [id number] ApertiohyMeter ID: [id number]       Lipid Panel [[id number]]  (Abnormal) Collected:  09/21/18 0312    Specimen:  Blood Updated:  09/21/18 0416     Total Cholesterol 195 mg/dL      Triglycerides 689 (H) mg/dL      HDL Cholesterol 27 (L) mg/dL      LDL Cholesterol  106 (H) mg/dL      LDL/HDL Ratio --     Comment: Unable to calculate       Osmolality, Serum [[id number]]  (Normal) Collected:  09/21/18 0312    Specimen:  Blood Updated:  09/21/18 0405     Osmolality 297 mOsm/kg     POC Glucose Once [[id number]]  (Abnormal) Collected:  09/21/18 0346    Specimen:  Blood Updated:  09/21/18 0357     Glucose 234 (H) mg/dL      Comment: : [id number] ApertiohyMeter ID: [id number]       Troponin [[id number]]  (Normal) Collected:  09/21/18 0312    Specimen:  Blood Updated:  09/21/18 0350     Troponin I <0.012 ng/mL     Basic Metabolic Panel [[id number]]  (Abnormal) Collected:  09/21/18 0312    Specimen:  Blood Updated:  09/21/18 0347     Glucose 255 (H) mg/dL      BUN 8 mg/dL      Creatinine 0.70 mg/dL      Sodium 140 mmol/L      Potassium 4.0 mmol/L      Chloride 105 mmol/L      CO2 25.0 mmol/L      Calcium 8.8 mg/dL      eGFR  African Amer >150 mL/min/1.73      BUN/Creatinine Ratio 11.4     Anion Gap 10.0 mmol/L     Phosphorus [[id number]]  (Normal) Collected:  09/21/18 0312    Specimen:  Blood Updated:   09/21/18 0339     Phosphorus 4.0 mg/dL     Magnesium [[id number]]  (Normal) Collected:  09/21/18 0312    Specimen:  Blood Updated:  09/21/18 0339     Magnesium 1.9 mg/dL     Lipase [[id number]]  (Normal) Collected:  09/21/18 0312    Specimen:  Blood Updated:  09/21/18 0339     Lipase 42 U/L     POC Glucose Once [[id number]]  (Abnormal) Collected:  09/21/18 0244    Specimen:  Blood Updated:  09/21/18 0254     Glucose 312 (H) mg/dL      Comment: : [id number] BRD MotorcycleshyMeter ID: [id number]       POC Glucose Once [[id number]]  (Abnormal) Collected:  09/21/18 0149    Specimen:  Blood Updated:  09/21/18 0200     Glucose 333 (H) mg/dL      Comment: : [id number] BRD MotorcycleshyMeter ID: [id number]       Urine Drug Screen - Urine, Clean Catch [[id number]]  (Abnormal) Collected:  09/20/18 2133    Specimen:  Urine from Urine, Clean Catch Updated:  09/21/18 0141     Amphetamine Screen, Urine Negative     Barbiturates Screen, Urine Negative     Benzodiazepine Screen, Urine Negative     Cocaine Screen, Urine Negative     Methadone Screen, Urine Negative     Opiate Screen Positive (A)     Phencyclidine (PCP), Urine Negative     THC, Screen, Urine Negative    POC Glucose Once [[id number]]  (Abnormal) Collected:  09/21/18 0107    Specimen:  Blood Updated:  09/21/18 0118     Glucose 372 (H) mg/dL      Comment: : [id number] BRD MotorcycleshyMeter ID: [id number]       Troponin [[id number]]  (Normal) Collected:  09/21/18 0040    Specimen:  Blood Updated:  09/21/18 0109     Troponin I <0.012 ng/mL     Glucose, Random [[id number]]  (Abnormal) Collected:  09/21/18 0040    Specimen:  Blood Updated:  09/21/18 0057     Glucose 421 (H) mg/dL     POC Glucose Once [[id number]]  (Abnormal) Collected:  09/20/18 2352    Specimen:  Blood Updated:  09/21/18 0012     Glucose 519 (C) mg/dL      Comment: : [id number] BRD MotorcycleshyMeter ID: [id number]       POC Glucose Once [[id number]]  (Abnormal) Collected:  09/20/18 6129    Specimen:  Blood Updated:   09/21/18 0012     Glucose 493 (C) mg/dL      Comment: : [id number] Cleopatra KathyMeter ID: [id number]       POC Glucose Once [[id number]]  (Abnormal) Collected:  09/20/18 2316    Specimen:  Blood Updated:  09/20/18 2327     Glucose 533 (C) mg/dL      Comment: : [id number] Olamide DavidMeter ID: [id number]       Blood Gas, Arterial [[id number]]  (Abnormal) Collected:  09/20/18 2206    Specimen:  Arterial Blood Updated:  09/20/18 2209     Site Right Radial     Americo's Test Positive     pH, Arterial 7.357 pH units      pCO2, Arterial 39.2 mm Hg      pO2, Arterial 114.0 (H) mm Hg      HCO3, Arterial 22.0 mmol/L      Base Excess, Arterial -3.2 (L) mmol/L      O2 Saturation, Arterial 98.8 %      Temperature 37.0 C      Barometric Pressure for Blood Gas 750 mmHg      Modality Nasal Cannula     Flow Rate 2.0 lpm      Ventilator Mode NA     Collected by [id number]    Urinalysis With Microscopic If Indicated (No Culture) - Urine, Clean Catch [[id number]]  (Abnormal) Collected:  09/20/18 2133    Specimen:  Urine from Urine, Clean Catch Updated:  09/20/18 2145     Color, UA Yellow     Appearance, UA Clear     pH, UA 5.5     Specific Gravity, UA >1.030 (H)     Glucose, UA >=1000 mg/dL (3+) (A)     Ketones, UA Negative     Bilirubin, UA Negative     Blood, UA Negative     Protein, UA Negative     Leuk Esterase, UA Negative     Nitrite, UA Negative     Urobilinogen, UA 0.2 E.U./dL    Narrative:       Urine microscopic not indicated.    Acetone [[id number]]  (Normal) Collected:  09/20/18 2054    Specimen:  Blood Updated:  09/20/18 2135     Acetone Negative    Troponin [[id number]]  (Normal) Collected:  09/20/18 2054    Specimen:  Blood Updated:  09/20/18 2124     Troponin I <0.012 ng/mL     Comprehensive Metabolic Panel [[id number]]  (Abnormal) Collected:  09/20/18 2054    Specimen:  Blood Updated:  09/20/18 2120     Glucose 910 (C) mg/dL      BUN 7 mg/dL      Creatinine 0.96 mg/dL      Sodium 131 (L) mmol/L      Potassium 5.1 mmol/L       Chloride 93 (L) mmol/L      CO2 19.0 (L) mmol/L      Calcium 9.4 mg/dL      Total Protein 7.2 g/dL      Albumin 4.60 g/dL      ALT (SGPT) 19 U/L      AST (SGOT) 32 U/L      Alkaline Phosphatase 97 U/L      Total Bilirubin 1.1 (H) mg/dL      eGFR  African Amer 116 mL/min/1.73      Globulin 2.6 gm/dL      A/G Ratio 1.8 g/dL      BUN/Creatinine Ratio 7.3     Anion Gap 19.0 (H) mmol/L     CBC Auto Differential [[id number]]  (Abnormal) Collected:  09/20/18 2054    Specimen:  Blood Updated:  09/20/18 2115     WBC 8.21 10*3/mm3      RBC 4.89 10*6/mm3      Hemoglobin 13.7 (L) g/dL      Hematocrit 38.6 (L) %      MCV 78.9 (L) fL      MCH 28.0 pg      MCHC 35.5 g/dL      RDW 12.4 %      RDW-SD 35.0 (L) fl      MPV 13.9 (H) fL      Platelets 199 10*3/mm3      Neutrophil % 65.8 %      Lymphocyte % 28.5 %      Monocyte % 5.1 %      Eosinophil % 0.0 %      Basophil % 0.4 %      Neutrophils, Absolute 5.40 10*3/mm3      Lymphocytes, Absolute 2.34 10*3/mm3      Monocytes, Absolute 0.42 10*3/mm3      Eosinophils, Absolute 0.00 10*3/mm3      Basophils, Absolute 0.03 10*3/mm3     D-dimer, Quantitative [[id number]]  (Normal) Collected:  09/20/18 2054    Specimen:  Blood Updated:  09/20/18 2111     D-Dimer, Quantitative 0.22 mg/L (FEU)      Imaging Results (last 24 hours)     Procedure Component Value Units Date/Time    XR Chest 1 View [[id number]] Collected:  09/20/18 2122     Updated:  09/20/18 2126    Narrative:       EXAMINATION: XR CHEST 1 VW- 9/20/2018 9:22 PM CDT     HISTORY: Chest pain     COMPARISON: 8/6/2018     FINDINGS:  The heart and mediastinal contours are stable compared to the previous  exam. Heart is magnified but felt to be normal in size. The lungs appear  clear. There is no appreciable pneumothorax or pleural effusion.       Impression:       No evidence of acute cardiopulmonary process.  This report was finalized on 09/20/2018 21:23 by Dr. Tristin Juarez MD.        Consults: None    Chief Complaint on Day of  "Discharge: Chest pain resolved, patient feeling much better and anxious for discharge    Hospital Course  Patient is a 23 y.o. male presented with chest pain.  Patient is recently moved from [place] and has been without any medications for 7 months.  He has insulin-dependent diabetic, asthma, hypertension, bipolar disorder.  Initial glucose was 910.  Patient was placed in the intensive care unit on an insulin drip.  He was not diabetic ketoacidosis.  He underwent stress echocardiogram today which was negative.  Workup also revealed hypertriglyceridemia.  Pharmacy is [facility], I discussed with them insurance coverage and I am assured his insurance will cover all of his insulin supplies, they are currently checking on antihypertensive, treatment of hypertriglyceridemia, GERD.  I have discussed with him the importance of taking his medications.  He has been provided in for patient/education by diabetic instructor/dietitian.  He assures me he will follow instructions.  I have discussed same with his mother via telephone.  She has asked me if he can be provided treatment for his bipolar disorder.  I instructed that if I was unable to get him an appointment then his primary care that we are establishing him with-[facility], we will be able to do so.  He is discharged in stable condition.    Condition on Discharge:  Stable    Physical Exam on Discharge:  /94   Pulse 87   Temp 98.5 °F (36.9 °C) (Temporal Artery )   Resp 14   Ht 177.8 cm (70\")   Wt (!) 146 kg (321 lb 14 oz)   SpO2 96%   BMI 46.18 kg/m²      Physical Exam   Constitutional: He is oriented to person, place, and time. He appears well-developed and well-nourished. No distress.   HENT:   Head: Normocephalic and atraumatic.   Eyes: Pupils are equal, round, and reactive to light. Conjunctivae and EOM are normal. No scleral icterus.   Neck: Normal range of motion. Neck supple. No JVD present. No tracheal deviation present. "   Cardiovascular: Normal rate, regular rhythm, normal heart sounds and intact distal pulses.  Exam reveals no gallop.    No murmur heard.  Pulmonary/Chest: Effort normal and breath sounds normal. No respiratory distress. He has no wheezes. He has no rales.   Abdominal: Soft. Bowel sounds are normal. He exhibits no distension. There is no tenderness. There is no guarding.   Musculoskeletal: Normal range of motion. He exhibits no edema.   Neurological: He is alert and oriented to person, place, and time.   No obvious deficits noted.   Skin: Skin is warm and dry. No rash noted. He is not diaphoretic. No erythema. No pallor.   Psychiatric: He has a normal mood and affect. His behavior is normal.   Vitals reviewed.      Discharge Disposition:  Home or Self Care    Discharge Medications:     Discharge Medications      New Medications      Instructions Start Date   accu-chek multiclix lancets   Monitor glucose ac/hs      fenofibrate 145 MG tablet  Commonly known as:  TRICOR   145 mg, Oral, Daily      glucose blood test strip   Use as instructed      glucose monitor monitoring kit   1 each, Does not apply, As Needed      insulin lispro 100 UNIT/ML injection  Commonly known as:  humaLOG   0-9 Units, Subcutaneous, 4 Times Daily With Meals & Nightly      lisinopril 10 MG tablet  Commonly known as:  PRINIVIL,ZESTRIL   10 mg, Oral, Daily      omeprazole 40 MG capsule  Commonly known as:  priLOSEC   40 mg, Oral, Daily      pneumococcal polysaccharide 23-valent 25 MCG/0.5ML vaccine  Commonly known as:  PNEUMOVAX-23   0.5 mL, Intramuscular, During Hospitalization         Changes to Medications      Instructions Start Date   albuterol 108 (90 Base) MCG/ACT inhaler  Commonly known as:  PROVENTIL HFA;VENTOLIN HFA  What changed:  Another medication with the same name was removed. Continue taking this medication, and follow the directions you see here.   2 puffs, Inhalation, Every 4 Hours PRN      insulin detemir 100 UNIT/ML  injection  Commonly known as:  LEVEMIR  What changed:  how much to take   25 Units, Subcutaneous, 2 Times Daily         Stop These Medications    LYRICA PO            Discharge Diet:  cardiac-diabetic diet    Discharge Care Plan / Instructions:   1.  Keep appointment as scheduled with Femi paul  2.  Keep scheduled appointment with [facility] if can be obtained  3.  Follow instructions regarding diabetic care  4.  Return to emergency room over the weekend if symptoms return.    Activity at Discharge:   Activity Instructions     Activity as Tolerated             Follow-up Appointments: Femi paul and [facility] if can be obtained    Test Results Pending at Discharge:      Plan discussed with Dr. Damir Barnhart.     Time spent in face-to-face evaluation, chart review, planning and education 45 minutes.    BIB Hopkins  09/21/18  4:13 PM    I personally evaluated and examined the patient in conjunction with BIB Jones and agree with the assessment, treatment plan, and disposition of the patient as recorded by her. My history, exam, and further recommendations are:           He is a 23-year-old man admitted  last night for severe hyperglycemia (110).  His serum osmolality was 297, pH was normal and he received bolus of IV fluid and subsequently started on insulin drip.  He was admitted in the critical care unit bed for further management of severe hyperglycemia.  On top of this, he had complained of chest pain.  He is chest pain-free now.  He underwent vitamin stress echocardiogram.  This was interpreted as normal stress echo with no significant ECG or echocardiographic evidence of myocardial ischemia.     Patient was seen in consult by dietitian and diabetic educator.  Prior to his discharge we verified with [facility] here at [place] that he was able to feel his prescription in the past given [place].  We also called [facility]  at him an appointment this coming Monday.    I evaluated him and examined.  I spoke to his mom over face time using his phone.  I emphasized to him importance of diabetic control.  I have not changed his regimen.  Instead, I strongly advised him to record his fingerstick sugar and bring this record to his primary care provider to make necessary changes as needed.  Patient was cautioned regarding hypoglycemic side effects of medication.    He  will require TriCor for hypertriglyceridemia area and patient is strongly advised to lose weight.      Other plan per orders.  This document is in conjunction with Karen.  Damir Barnhart MD  09/21/18  4:47 PM

## 2018-09-22 ENCOUNTER — HOSPITAL ENCOUNTER (EMERGENCY)
Facility: HOSPITAL | Age: 23
Discharge: HOME | End: 2018-09-23
Admitting: NURSE PRACTITIONER

## 2018-09-22 ENCOUNTER — NURSE TRIAGE (OUTPATIENT)
Dept: CALL CENTER | Facility: HOSPITAL | Age: 23
End: 2018-09-22

## 2018-09-22 ENCOUNTER — APPOINTMENT (OUTPATIENT)
Dept: GENERAL RADIOLOGY | Facility: HOSPITAL | Age: 23
End: 2018-09-22

## 2018-09-22 ENCOUNTER — READMISSION MANAGEMENT (OUTPATIENT)
Dept: CALL CENTER | Facility: HOSPITAL | Age: 23
End: 2018-09-22

## 2018-09-22 VITALS — BODY MASS INDEX: 45.34 KG/M2 | WEIGHT: 315 LBS

## 2018-09-22 DIAGNOSIS — E11.9 TYPE 2 DIABETES MELLITUS WITHOUT COMPLICATION, UNSPECIFIED LONG TERM INSULIN USE STATUS: ICD-10-CM

## 2018-09-22 DIAGNOSIS — R73.9 HYPERGLYCEMIA: Primary | ICD-10-CM

## 2018-09-22 LAB
ACETONE BLD QL: NEGATIVE
ALBUMIN SERPL-MCNC: 4.5 G/DL (ref 3.5–5)
ALBUMIN/GLOB SERPL: 1.5 G/DL (ref 1.1–2.5)
ALP SERPL-CCNC: 109 U/L (ref 24–120)
ALT SERPL W P-5'-P-CCNC: 30 U/L (ref 0–54)
ANION GAP SERPL CALCULATED.3IONS-SCNC: 16 MMOL/L (ref 4–13)
AST SERPL-CCNC: 24 U/L (ref 7–45)
BASOPHILS # BLD AUTO: 0.02 10*3/MM3 (ref 0–0.2)
BASOPHILS NFR BLD AUTO: 0.3 % (ref 0–2)
BILIRUB SERPL-MCNC: 0.6 MG/DL (ref 0.1–1)
BUN BLD-MCNC: 7 MG/DL (ref 5–21)
BUN/CREAT SERPL: 11.7 (ref 7–25)
CALCIUM SPEC-SCNC: 9.8 MG/DL (ref 8.4–10.4)
CHLORIDE SERPL-SCNC: 93 MMOL/L (ref 98–110)
CO2 SERPL-SCNC: 24 MMOL/L (ref 24–31)
CREAT BLD-MCNC: 0.6 MG/DL (ref 0.5–1.4)
DEPRECATED RDW RBC AUTO: 35.9 FL (ref 40–54)
EOSINOPHIL # BLD AUTO: 0 10*3/MM3 (ref 0–0.7)
EOSINOPHIL NFR BLD AUTO: 0 % (ref 0–4)
ERYTHROCYTE [DISTWIDTH] IN BLOOD BY AUTOMATED COUNT: 12.4 % (ref 12–15)
GFR SERPL CREATININE-BSD FRML MDRD: >150 ML/MIN/1.73
GLOBULIN UR ELPH-MCNC: 3.1 GM/DL
GLUCOSE BLD-MCNC: 675 MG/DL (ref 70–100)
GLUCOSE BLDC GLUCOMTR-MCNC: 356 MG/DL (ref 70–130)
GLUCOSE BLDC GLUCOMTR-MCNC: 376 MG/DL (ref 70–130)
GLUCOSE BLDC GLUCOMTR-MCNC: 408 MG/DL (ref 70–130)
HCT VFR BLD AUTO: 41.1 % (ref 40–52)
HGB BLD-MCNC: 14.4 G/DL (ref 14–18)
HOLD SPECIMEN: NORMAL
HOLD SPECIMEN: NORMAL
LYMPHOCYTES # BLD AUTO: 2.2 10*3/MM3 (ref 0.72–4.86)
LYMPHOCYTES NFR BLD AUTO: 27.8 % (ref 15–45)
MCH RBC QN AUTO: 28 PG (ref 28–32)
MCHC RBC AUTO-ENTMCNC: 35 G/DL (ref 33–36)
MCV RBC AUTO: 80 FL (ref 82–95)
MONOCYTES # BLD AUTO: 0.4 10*3/MM3 (ref 0.19–1.3)
MONOCYTES NFR BLD AUTO: 5.1 % (ref 4–12)
NEUTROPHILS # BLD AUTO: 5.26 10*3/MM3 (ref 1.87–8.4)
NEUTROPHILS NFR BLD AUTO: 66.5 % (ref 39–78)
NT-PROBNP SERPL-MCNC: 137 PG/ML (ref 0–450)
PLATELET # BLD AUTO: 181 10*3/MM3 (ref 130–400)
PMV BLD AUTO: 13.8 FL (ref 6–12)
POTASSIUM BLD-SCNC: 4.1 MMOL/L (ref 3.5–5.3)
PROT SERPL-MCNC: 7.6 G/DL (ref 6.3–8.7)
RBC # BLD AUTO: 5.14 10*6/MM3 (ref 4.8–5.9)
SODIUM BLD-SCNC: 133 MMOL/L (ref 135–145)
TROPONIN I SERPL-MCNC: 0.03 NG/ML (ref 0–0.03)
WBC NRBC COR # BLD: 7.9 10*3/MM3 (ref 4.8–10.8)
WHOLE BLOOD HOLD SPECIMEN: NORMAL
WHOLE BLOOD HOLD SPECIMEN: NORMAL

## 2018-09-22 PROCEDURE — 82962 GLUCOSE BLOOD TEST: CPT

## 2018-09-22 PROCEDURE — 84484 ASSAY OF TROPONIN QUANT: CPT | Performed by: PHYSICIAN ASSISTANT

## 2018-09-22 PROCEDURE — 80053 COMPREHEN METABOLIC PANEL: CPT

## 2018-09-22 PROCEDURE — 83880 ASSAY OF NATRIURETIC PEPTIDE: CPT | Performed by: PHYSICIAN ASSISTANT

## 2018-09-22 PROCEDURE — 99284 EMERGENCY DEPT VISIT MOD MDM: CPT

## 2018-09-22 PROCEDURE — 82009 KETONE BODYS QUAL: CPT | Performed by: PHYSICIAN ASSISTANT

## 2018-09-22 PROCEDURE — 96374 THER/PROPH/DIAG INJ IV PUSH: CPT

## 2018-09-22 PROCEDURE — 71045 X-RAY EXAM CHEST 1 VIEW: CPT

## 2018-09-22 PROCEDURE — 25010000002 ONDANSETRON PER 1 MG: Performed by: PHYSICIAN ASSISTANT

## 2018-09-22 PROCEDURE — 93010 ELECTROCARDIOGRAM REPORT: CPT | Performed by: INTERNAL MEDICINE

## 2018-09-22 PROCEDURE — 93005 ELECTROCARDIOGRAM TRACING: CPT | Performed by: PHYSICIAN ASSISTANT

## 2018-09-22 PROCEDURE — 85025 COMPLETE CBC W/AUTO DIFF WBC: CPT

## 2018-09-22 PROCEDURE — 63710000001 INSULIN REGULAR HUMAN PER 5 UNITS: Performed by: PHYSICIAN ASSISTANT

## 2018-09-22 RX ORDER — ONDANSETRON 2 MG/ML
4 INJECTION INTRAMUSCULAR; INTRAVENOUS ONCE
Status: COMPLETED | OUTPATIENT
Start: 2018-09-22 | End: 2018-09-22

## 2018-09-22 RX ADMIN — SODIUM CHLORIDE 500 ML: 9 INJECTION, SOLUTION INTRAVENOUS at 23:27

## 2018-09-22 RX ADMIN — ONDANSETRON HYDROCHLORIDE 4 MG: 2 INJECTION, SOLUTION INTRAMUSCULAR; INTRAVENOUS at 23:05

## 2018-09-22 RX ADMIN — SODIUM CHLORIDE 1000 ML: 9 INJECTION, SOLUTION INTRAVENOUS at 21:51

## 2018-09-22 RX ADMIN — INSULIN HUMAN 6 UNITS: 100 INJECTION, SOLUTION PARENTERAL at 23:35

## 2018-09-22 RX ADMIN — INSULIN HUMAN 10 UNITS: 100 INJECTION, SOLUTION PARENTERAL at 21:51

## 2018-09-22 NOTE — OUTREACH NOTE
Prep Survey      Responses   Facility patient discharged from?  [facility]   Is patient eligible?  Yes   Discharge diagnosis  Insulin-dependent diabetes with severe hyperglycemia   Does the patient have one of the following disease processes/diagnoses(primary or secondary)?  Other   Does the patient have Home health ordered?  No   Is there a DME ordered?  No   Comments regarding appointments  call for apmt   Prep survey completed?  Yes          Helen Montesinos RN

## 2018-09-22 NOTE — TELEPHONE ENCOUNTER
"Caller has not eaten today. No appetite. Has drank 2 1/2 gallons of water today and still so thirsty.     Reason for Disposition  • Blood glucose > 500 mg/dl (27.5 mmol/l)    Additional Information  • Negative: Unconscious or difficult to awaken  • Negative: Acting confused (e.g., disoriented, slurred speech)  • Negative: Very weak (e.g., can't stand)  • Negative: Sounds like a life-threatening emergency to the triager  • Negative: [1] Vomiting AND [2] signs of dehydration (e.g., very dry mouth, lightheaded, etc.)  • Negative: [1] Blood glucose > 240 mg/dl (13 mmol/l) AND [2] rapid breathing    Answer Assessment - Initial Assessment Questions  1. BLOOD GLUCOSE: \"What is your blood glucose level?\"       \"high on the meter\"  2. ONSET: \"When did you check the blood glucose?\"      Today @ 17:30  3. USUAL RANGE: \"What is your glucose level usually?\" (e.g., usual fasting morning value, usual evening value)      575 last night  4. KETONES: \"Do you check for ketones (urine or blood test strips)?\" If yes, ask: \"What does the test show now?\"       Does not have test strips  5. TYPE 1 or 2:  \"Do you know what type of diabetes you have?\"  (e.g., Type 1, Type 2, Gestational; doesn't know)       Type 2  6. INSULIN: \"Do you take insulin?\" If yes, ask: \"Have you missed any shots recently?\"      Yes and has taken his insulin  7. DIABETES PILLS: \"Do you take any pills for your diabetes?\" If yes, ask: \"Have you missed taking any pills recently?\"      No oral meds for diabetes  8. OTHER SYMPTOMS: \"Do you have any symptoms?\" (e.g., fever, frequent urination, difficulty breathing, dizziness, weakness, vomiting)      Nausea, diarrhea, weakness, thrist  9. PREGNANCY: \"Is there any chance you are pregnant?\" \"When was your last menstrual period?\"     na    Protocols used: DIABETES - HIGH BLOOD SUGAR-ADULT-AH      "

## 2018-09-23 VITALS
HEART RATE: 75 BPM | WEIGHT: 314.8 LBS | HEIGHT: 69 IN | OXYGEN SATURATION: 98 % | BODY MASS INDEX: 46.63 KG/M2 | DIASTOLIC BLOOD PRESSURE: 70 MMHG | TEMPERATURE: 98.8 F | RESPIRATION RATE: 16 BRPM | SYSTOLIC BLOOD PRESSURE: 146 MMHG

## 2018-09-23 LAB
GLUCOSE BLDC GLUCOMTR-MCNC: 340 MG/DL (ref 70–130)
GLUCOSE BLDC GLUCOMTR-MCNC: 345 MG/DL (ref 70–130)
TROPONIN I SERPL-MCNC: 0.02 NG/ML (ref 0–0.03)

## 2018-09-23 PROCEDURE — 82962 GLUCOSE BLOOD TEST: CPT

## 2018-09-23 RX ORDER — ONDANSETRON 4 MG/1
4 TABLET, ORALLY DISINTEGRATING ORAL EVERY 6 HOURS PRN
Qty: 12 TABLET | Refills: 0 | Status: SHIPPED | OUTPATIENT
Start: 2018-09-23

## 2018-09-23 NOTE — ED PROVIDER NOTES
Subjective   23-year-old male presents with a chief complaint of weakness and dyspnea on exertion.  The patient reports he began expecting profound weakness last night as well as anorexia.  He tried eating a bowl of chicken noodle soup was unable to finish it.  The patient has had nausea throughout the day today.  He notes that yesterday he was discharged from the hospital after being admitted to the intensive care unit for hyperglycemia without ketoacidosis.  He notes he is a type II diabetic who was initially started on oral medications but then later required insulin.  He moved here from [place] roughly 1 month ago and he had gone without his insulin since.  Since his discharge he has been using his insulin.  He denies fevers chills cough and current chest pain.  The patient does note he is having frequency and urgency with urination.            Review of Systems   All other systems reviewed and are negative.      Past Medical History:   Diagnosis Date   • Asthma    • Atelectasis    • Depression    • Diabetes mellitus (CMS/HCC)    • Hyperlipidemia    • Hypertension    • Neuropathy    • PTSD (post-traumatic stress disorder)    • TIA (transient ischemic attack)        Allergies   Allergen Reactions   • Penicillins Anaphylaxis   • Keflex [Cephalexin] Hives       Past Surgical History:   Procedure Laterality Date   • HERNIA REPAIR         Family History   Problem Relation Age of Onset   • Heart failure Father    • Heart failure Maternal Grandmother        Social History     Social History   • Marital status: Single     Social History Main Topics   • Smoking status: Current Some Day Smoker     Types: Cigars   • Alcohol use Yes      Comment: on occassion   • Drug use: Yes     Types: Marijuana      Comment: on occassion     Other Topics Concern   • Not on file           Objective   Physical Exam   Constitutional: He is oriented to person, place, and time. He appears well-developed and well-nourished.   HENT:   Head:  Normocephalic and atraumatic.   Eyes: Pupils are equal, round, and reactive to light. EOM are normal.   Neck: Normal range of motion. Neck supple.   Cardiovascular: Normal rate and regular rhythm.    Pulmonary/Chest: Effort normal and breath sounds normal.   Abdominal: Soft. Bowel sounds are normal. He exhibits no distension. There is no tenderness.   Musculoskeletal: Normal range of motion.   Lymphadenopathy:     He has no cervical adenopathy.   Neurological: He is alert and oriented to person, place, and time.   Skin: Skin is warm and dry.   Psychiatric: He has a normal mood and affect. His behavior is normal.   Nursing note and vitals reviewed.      Procedures           ED Course  ED Course as of Sep 23 0126   Sun Sep 23, 2018   0117 This is a turn over from [facility] PERDUE. Patient has received IV fluids as well as insulin with improvements noted. We will discharge home in stable condition with plans to f/u with pcp.   [TW]      ED Course User Index  [TW] Kate Rivera, APRN        XR Chest 1 View   Final Result   No active disease is seen.           This report was finalized on 09/22/2018 21:56 by Dr. Mann Sierra MD.        Labs Reviewed   COMPREHENSIVE METABOLIC PANEL - Abnormal; Notable for the following:        Result Value    Glucose 675 (*)     Sodium 133 (*)     Chloride 93 (*)     Anion Gap 16.0 (*)     All other components within normal limits   CBC WITH AUTO DIFFERENTIAL - Abnormal; Notable for the following:     MCV 80.0 (*)     RDW-SD 35.9 (*)     MPV 13.8 (*)     All other components within normal limits   POCT GLUCOSE FINGERSTICK - Abnormal; Notable for the following:     Glucose 408 (*)     All other components within normal limits   POCT GLUCOSE FINGERSTICK - Abnormal; Notable for the following:     Glucose 356 (*)     All other components within normal limits   POCT GLUCOSE FINGERSTICK - Abnormal; Notable for the following:     Glucose 376 (*)     All other components within normal  limits   POCT GLUCOSE FINGERSTICK - Abnormal; Notable for the following:     Glucose 345 (*)     All other components within normal limits   ACETONE - Normal   BNP (IN-HOUSE) - Normal   TROPONIN (IN-HOUSE) - Normal   TROPONIN (IN-HOUSE) - Normal   RAINBOW DRAW    Narrative:     The following orders were created for panel order [facility] Draw.  Procedure                               Abnormality         Status                     ---------                               -----------         ------                     Light Blue Top[[id number]]                                   Final result               Green Top (Gel)[[id number]]                                  Final result               Lavender Top[[id number]]                                     Final result               Red Top[[id number]]                                          Final result                 Please view results for these tests on the individual orders.   URINALYSIS W/ CULTURE IF INDICATED   POCT GLUCOSE FINGERSTICK   CBC AND DIFFERENTIAL    Narrative:     The following orders were created for panel order CBC & Differential.  Procedure                               Abnormality         Status                     ---------                               -----------         ------                     Manual Differential[[id number]]                                                         CBC Auto Differential[[id number]]        Abnormal            Final result                 Please view results for these tests on the individual orders.   LIGHT BLUE TOP   GREEN TOP   LAVENDER TOP   RED TOP               MDM  Number of Diagnoses or Management Options  Hyperglycemia: new and requires workup  Type 2 diabetes mellitus without complication, unspecified long term insulin use status (CMS/[facility]): new and requires workup     Amount and/or Complexity of Data Reviewed  Clinical lab tests: ordered and reviewed  Decide to obtain previous medical records or to obtain history  from someone other than the patient: yes  Discuss the patient with other providers: yes    Risk of Complications, Morbidity, and/or Mortality  Presenting problems: moderate  Diagnostic procedures: moderate  Management options: moderate    Patient Progress  Patient progress: improved   this is a turn over from [facility] PERDUE. Patient has received iv fluids as well as insulin. Glucose has improved. Patient has had no vomiting while in the ER. Dr. Han has reviewed the labs and case and agrees with treatment plan. Patient was provided in discharge information yesterday the pcp he is to f/u with on Monday. He will need to f/u as recommended and/or return with any acute or worsening sxs.       Final diagnoses:   Hyperglycemia   Type 2 diabetes mellitus without complication, unspecified long term insulin use status (CMS/[facility])            Kate Rivera, APRN  09/23/18 0129

## 2018-09-25 ENCOUNTER — READMISSION MANAGEMENT (OUTPATIENT)
Dept: CALL CENTER | Facility: HOSPITAL | Age: 23
End: 2018-09-25

## 2018-09-25 NOTE — OUTREACH NOTE
Medical Week 1 Survey      Responses   Facility patient discharged from?  [facility]   Does the patient have one of the following disease processes/diagnoses(primary or secondary)?  Other   Is there a successful TCM telephone encounter documented?  No   Week 1 attempt successful?  No   Unsuccessful attempts  Attempt 1          Melly Uriostegui RN

## 2018-09-25 NOTE — PROGRESS NOTES
Continued Stay Note   [place]     Patient Name: Pio Mckeon  MRN: [id number]  Today's Date: 9/25/2018    Admit Date: 9/20/2018          Discharge Plan     Row Name 09/25/18 1143       Plan    Final Discharge Disposition Code 01 - home or self-care    Final Note Patient discharged home with follow up appt with [facility].              Discharge Codes    No documentation.       Expected Discharge Date and Time     Expected Discharge Date Expected Discharge Time    Sep 21, 2018             NELY Patel

## 2018-10-01 ENCOUNTER — READMISSION MANAGEMENT (OUTPATIENT)
Dept: CALL CENTER | Facility: HOSPITAL | Age: 23
End: 2018-10-01

## 2018-10-01 NOTE — OUTREACH NOTE
Medical Week 1 Survey      Responses   Facility patient discharged from?  [facility]   Does the patient have one of the following disease processes/diagnoses(primary or secondary)?  Other   Is there a successful TCM telephone encounter documented?  No   Week 1 attempt successful?  Yes   Call start time  1338   Revoke  Decline to participate   Call end time  1342          Mabel Gusman RN